# Patient Record
Sex: FEMALE | Race: WHITE | Employment: OTHER | URBAN - METROPOLITAN AREA
[De-identification: names, ages, dates, MRNs, and addresses within clinical notes are randomized per-mention and may not be internally consistent; named-entity substitution may affect disease eponyms.]

---

## 2018-01-02 PROBLEM — I10 HTN (HYPERTENSION), BENIGN: Chronic | Status: ACTIVE | Noted: 2018-01-02

## 2018-01-02 PROBLEM — F90.9 ADHD: Chronic | Status: ACTIVE | Noted: 2018-01-02

## 2018-01-02 PROBLEM — E11.9 DIABETES MELLITUS TYPE 2, CONTROLLED (HCC): Chronic | Status: ACTIVE | Noted: 2018-01-02

## 2018-01-02 PROBLEM — N12 PYELONEPHRITIS: Status: ACTIVE | Noted: 2018-01-02

## 2018-01-02 PROBLEM — E78.2 MIXED HYPERLIPIDEMIA: Chronic | Status: ACTIVE | Noted: 2018-01-02

## 2018-01-04 PROBLEM — N12 PYELONEPHRITIS: Status: RESOLVED | Noted: 2018-01-02 | Resolved: 2018-01-04

## 2018-03-23 ENCOUNTER — TELEPHONE (OUTPATIENT)
Dept: ORTHOPEDIC SURGERY | Age: 78
End: 2018-03-23

## 2018-04-09 ENCOUNTER — HOSPITAL ENCOUNTER (OUTPATIENT)
Dept: CARDIOLOGY | Age: 78
Discharge: HOME | End: 2018-04-09
Payer: MEDICARE

## 2018-04-09 VITALS
HEIGHT: 62 IN | HEART RATE: 80 BPM | BODY MASS INDEX: 36.8 KG/M2 | SYSTOLIC BLOOD PRESSURE: 142 MMHG | WEIGHT: 200 LBS | DIASTOLIC BLOOD PRESSURE: 70 MMHG

## 2018-04-09 DIAGNOSIS — R07.9 CHEST PAIN, UNSPECIFIED TYPE: Primary | ICD-10-CM

## 2018-04-09 PROCEDURE — 78452 HT MUSCLE IMAGE SPECT MULT: CPT

## 2018-04-09 PROCEDURE — 6360000002 HC RX W HCPCS: Performed by: FAMILY MEDICINE

## 2018-04-09 PROCEDURE — 93017 CV STRESS TEST TRACING ONLY: CPT

## 2018-04-09 PROCEDURE — 3430000000 HC RX DIAGNOSTIC RADIOPHARMACEUTICAL: Performed by: INTERNAL MEDICINE

## 2018-04-09 PROCEDURE — A9500 TC99M SESTAMIBI: HCPCS | Performed by: INTERNAL MEDICINE

## 2018-04-09 PROCEDURE — 2580000003 HC RX 258: Performed by: INTERNAL MEDICINE

## 2018-04-09 RX ORDER — SODIUM CHLORIDE 0.9 % (FLUSH) 0.9 %
10 SYRINGE (ML) INJECTION PRN
Status: DISCONTINUED | OUTPATIENT
Start: 2018-04-09 | End: 2018-04-10 | Stop reason: HOSPADM

## 2018-04-09 RX ADMIN — Medication 10.1 MILLICURIE: at 09:34

## 2018-04-09 RX ADMIN — Medication 32.1 MILLICURIE: at 10:43

## 2018-04-09 RX ADMIN — Medication 10 ML: at 10:44

## 2018-04-09 RX ADMIN — Medication 10 ML: at 09:34

## 2018-04-09 RX ADMIN — REGADENOSON 0.4 MG: 0.08 INJECTION, SOLUTION INTRAVENOUS at 10:43

## 2018-04-09 RX ADMIN — Medication 10 ML: at 10:43

## 2018-04-24 ENCOUNTER — TELEPHONE (OUTPATIENT)
Dept: ORTHOPEDIC SURGERY | Age: 78
End: 2018-04-24

## 2018-04-30 ENCOUNTER — TELEPHONE (OUTPATIENT)
Dept: ORTHOPEDIC SURGERY | Age: 78
End: 2018-04-30

## 2018-05-02 ENCOUNTER — HOSPITAL ENCOUNTER (OUTPATIENT)
Dept: PREADMISSION TESTING | Age: 78
Discharge: HOME | End: 2018-05-02
Payer: MEDICARE

## 2018-05-02 ENCOUNTER — HOSPITAL ENCOUNTER (OUTPATIENT)
Dept: GENERAL RADIOLOGY | Age: 78
Discharge: HOME | End: 2018-05-04
Payer: MEDICARE

## 2018-05-02 VITALS
SYSTOLIC BLOOD PRESSURE: 142 MMHG | BODY MASS INDEX: 36.99 KG/M2 | WEIGHT: 201 LBS | DIASTOLIC BLOOD PRESSURE: 66 MMHG | OXYGEN SATURATION: 99 % | HEIGHT: 62 IN | HEART RATE: 61 BPM | TEMPERATURE: 97.4 F | RESPIRATION RATE: 20 BRPM

## 2018-05-02 DIAGNOSIS — M17.11 PRIMARY OSTEOARTHRITIS OF RIGHT KNEE: ICD-10-CM

## 2018-05-02 DIAGNOSIS — Z01.818 PREOP TESTING: ICD-10-CM

## 2018-05-02 LAB
ABO/RH: NORMAL
ALBUMIN SERPL-MCNC: 4.2 G/DL (ref 3.5–5.2)
ALP BLD-CCNC: 52 U/L (ref 35–104)
ALT SERPL-CCNC: 11 U/L (ref 0–32)
ANION GAP SERPL CALCULATED.3IONS-SCNC: 13 MMOL/L (ref 7–16)
ANTIBODY SCREEN: NORMAL
AST SERPL-CCNC: 12 U/L (ref 0–31)
BACTERIA: ABNORMAL /HPF
BASOPHILS ABSOLUTE: 0.05 E9/L (ref 0–0.2)
BASOPHILS RELATIVE PERCENT: 0.7 % (ref 0–2)
BILIRUB SERPL-MCNC: 0.3 MG/DL (ref 0–1.2)
BILIRUBIN URINE: NEGATIVE
BLOOD, URINE: NEGATIVE
BUN BLDV-MCNC: 23 MG/DL (ref 8–23)
CALCIUM SERPL-MCNC: 9.7 MG/DL (ref 8.6–10.2)
CHLORIDE BLD-SCNC: 102 MMOL/L (ref 98–107)
CLARITY: CLEAR
CO2: 22 MMOL/L (ref 22–29)
COLOR: YELLOW
CREAT SERPL-MCNC: 1.2 MG/DL (ref 0.5–1)
EOSINOPHILS ABSOLUTE: 0.21 E9/L (ref 0.05–0.5)
EOSINOPHILS RELATIVE PERCENT: 2.9 % (ref 0–6)
GFR AFRICAN AMERICAN: 53
GFR NON-AFRICAN AMERICAN: 44 ML/MIN/1.73
GLUCOSE BLD-MCNC: 114 MG/DL (ref 74–109)
GLUCOSE URINE: NEGATIVE MG/DL
HCT VFR BLD CALC: 31.8 % (ref 34–48)
HEMOGLOBIN: 10.6 G/DL (ref 11.5–15.5)
IMMATURE GRANULOCYTES #: 0.05 E9/L
IMMATURE GRANULOCYTES %: 0.7 % (ref 0–5)
KETONES, URINE: NEGATIVE MG/DL
LEUKOCYTE ESTERASE, URINE: ABNORMAL
LYMPHOCYTES ABSOLUTE: 2.16 E9/L (ref 1.5–4)
LYMPHOCYTES RELATIVE PERCENT: 30.3 % (ref 20–42)
MCH RBC QN AUTO: 30.5 PG (ref 26–35)
MCHC RBC AUTO-ENTMCNC: 33.3 % (ref 32–34.5)
MCV RBC AUTO: 91.4 FL (ref 80–99.9)
MONOCYTES ABSOLUTE: 0.52 E9/L (ref 0.1–0.95)
MONOCYTES RELATIVE PERCENT: 7.3 % (ref 2–12)
NEUTROPHILS ABSOLUTE: 4.15 E9/L (ref 1.8–7.3)
NEUTROPHILS RELATIVE PERCENT: 58.1 % (ref 43–80)
NITRITE, URINE: NEGATIVE
PDW BLD-RTO: 13.4 FL (ref 11.5–15)
PH UA: 5.5 (ref 5–9)
PLATELET # BLD: 283 E9/L (ref 130–450)
PMV BLD AUTO: 9.4 FL (ref 7–12)
POTASSIUM REFLEX MAGNESIUM: 4.6 MMOL/L (ref 3.5–5)
PROTEIN UA: NEGATIVE MG/DL
RBC # BLD: 3.48 E12/L (ref 3.5–5.5)
RBC UA: ABNORMAL /HPF (ref 0–2)
SODIUM BLD-SCNC: 137 MMOL/L (ref 132–146)
SPECIFIC GRAVITY UA: >=1.03 (ref 1–1.03)
TOTAL PROTEIN: 7.2 G/DL (ref 6.4–8.3)
UROBILINOGEN, URINE: 0.2 E.U./DL
WBC # BLD: 7.1 E9/L (ref 4.5–11.5)
WBC UA: ABNORMAL /HPF (ref 0–5)

## 2018-05-02 PROCEDURE — 86901 BLOOD TYPING SEROLOGIC RH(D): CPT

## 2018-05-02 PROCEDURE — 85025 COMPLETE CBC W/AUTO DIFF WBC: CPT

## 2018-05-02 PROCEDURE — 86900 BLOOD TYPING SEROLOGIC ABO: CPT

## 2018-05-02 PROCEDURE — 71046 X-RAY EXAM CHEST 2 VIEWS: CPT

## 2018-05-02 PROCEDURE — 86850 RBC ANTIBODY SCREEN: CPT

## 2018-05-02 PROCEDURE — 81001 URINALYSIS AUTO W/SCOPE: CPT

## 2018-05-02 PROCEDURE — 80053 COMPREHEN METABOLIC PANEL: CPT

## 2018-05-02 PROCEDURE — 87081 CULTURE SCREEN ONLY: CPT

## 2018-05-02 PROCEDURE — 36415 COLL VENOUS BLD VENIPUNCTURE: CPT

## 2018-05-03 LAB — MRSA CULTURE ONLY: NORMAL

## 2018-05-07 ENCOUNTER — ANESTHESIA EVENT (OUTPATIENT)
Dept: OPERATING ROOM | Age: 78
DRG: 470 | End: 2018-05-07
Payer: MEDICARE

## 2018-05-08 ENCOUNTER — HOSPITAL ENCOUNTER (INPATIENT)
Age: 78
LOS: 2 days | Discharge: NURSING, REHAB OR LONG-TERM CARE | DRG: 470 | End: 2018-05-10
Attending: ORTHOPAEDIC SURGERY | Admitting: ORTHOPAEDIC SURGERY
Payer: MEDICARE

## 2018-05-08 ENCOUNTER — ANESTHESIA (OUTPATIENT)
Dept: OPERATING ROOM | Age: 78
DRG: 470 | End: 2018-05-08
Payer: MEDICARE

## 2018-05-08 VITALS — SYSTOLIC BLOOD PRESSURE: 159 MMHG | OXYGEN SATURATION: 99 % | TEMPERATURE: 96.8 F | DIASTOLIC BLOOD PRESSURE: 75 MMHG

## 2018-05-08 DIAGNOSIS — M17.11 PRIMARY OSTEOARTHRITIS OF RIGHT KNEE: ICD-10-CM

## 2018-05-08 DIAGNOSIS — Z01.818 PREOP TESTING: Primary | ICD-10-CM

## 2018-05-08 LAB
METER GLUCOSE: 143 MG/DL (ref 70–110)
METER GLUCOSE: 230 MG/DL (ref 70–110)
METER GLUCOSE: 268 MG/DL (ref 70–110)

## 2018-05-08 PROCEDURE — 2500000003 HC RX 250 WO HCPCS: Performed by: ORTHOPAEDIC SURGERY

## 2018-05-08 PROCEDURE — 6360000002 HC RX W HCPCS: Performed by: NURSE ANESTHETIST, CERTIFIED REGISTERED

## 2018-05-08 PROCEDURE — 6360000002 HC RX W HCPCS: Performed by: ANESTHESIOLOGY

## 2018-05-08 PROCEDURE — 2580000003 HC RX 258: Performed by: ORTHOPAEDIC SURGERY

## 2018-05-08 PROCEDURE — 3700000000 HC ANESTHESIA ATTENDED CARE: Performed by: ORTHOPAEDIC SURGERY

## 2018-05-08 PROCEDURE — 6360000002 HC RX W HCPCS: Performed by: ORTHOPAEDIC SURGERY

## 2018-05-08 PROCEDURE — C1776 JOINT DEVICE (IMPLANTABLE): HCPCS | Performed by: ORTHOPAEDIC SURGERY

## 2018-05-08 PROCEDURE — 2720000010 HC SURG SUPPLY STERILE: Performed by: ORTHOPAEDIC SURGERY

## 2018-05-08 PROCEDURE — 7100000000 HC PACU RECOVERY - FIRST 15 MIN: Performed by: ORTHOPAEDIC SURGERY

## 2018-05-08 PROCEDURE — 3700000001 HC ADD 15 MINUTES (ANESTHESIA): Performed by: ORTHOPAEDIC SURGERY

## 2018-05-08 PROCEDURE — 6370000000 HC RX 637 (ALT 250 FOR IP): Performed by: INTERNAL MEDICINE

## 2018-05-08 PROCEDURE — 1200000000 HC SEMI PRIVATE

## 2018-05-08 PROCEDURE — 88305 TISSUE EXAM BY PATHOLOGIST: CPT

## 2018-05-08 PROCEDURE — G8979 MOBILITY GOAL STATUS: HCPCS

## 2018-05-08 PROCEDURE — 97530 THERAPEUTIC ACTIVITIES: CPT

## 2018-05-08 PROCEDURE — 27447 TOTAL KNEE ARTHROPLASTY: CPT | Performed by: ORTHOPAEDIC SURGERY

## 2018-05-08 PROCEDURE — 64447 NJX AA&/STRD FEMORAL NRV IMG: CPT | Performed by: ANESTHESIOLOGY

## 2018-05-08 PROCEDURE — G8987 SELF CARE CURRENT STATUS: HCPCS

## 2018-05-08 PROCEDURE — 6360000002 HC RX W HCPCS: Performed by: STUDENT IN AN ORGANIZED HEALTH CARE EDUCATION/TRAINING PROGRAM

## 2018-05-08 PROCEDURE — G8978 MOBILITY CURRENT STATUS: HCPCS

## 2018-05-08 PROCEDURE — 0232T NJX PLATELET PLASMA: CPT | Performed by: ORTHOPAEDIC SURGERY

## 2018-05-08 PROCEDURE — 2500000003 HC RX 250 WO HCPCS: Performed by: NURSE ANESTHETIST, CERTIFIED REGISTERED

## 2018-05-08 PROCEDURE — 6370000000 HC RX 637 (ALT 250 FOR IP): Performed by: STUDENT IN AN ORGANIZED HEALTH CARE EDUCATION/TRAINING PROGRAM

## 2018-05-08 PROCEDURE — C1713 ANCHOR/SCREW BN/BN,TIS/BN: HCPCS | Performed by: ORTHOPAEDIC SURGERY

## 2018-05-08 PROCEDURE — 2580000003 HC RX 258: Performed by: STUDENT IN AN ORGANIZED HEALTH CARE EDUCATION/TRAINING PROGRAM

## 2018-05-08 PROCEDURE — 97165 OT EVAL LOW COMPLEX 30 MIN: CPT

## 2018-05-08 PROCEDURE — 7100000001 HC PACU RECOVERY - ADDTL 15 MIN: Performed by: ORTHOPAEDIC SURGERY

## 2018-05-08 PROCEDURE — 3600000015 HC SURGERY LEVEL 5 ADDTL 15MIN: Performed by: ORTHOPAEDIC SURGERY

## 2018-05-08 PROCEDURE — 3600000005 HC SURGERY LEVEL 5 BASE: Performed by: ORTHOPAEDIC SURGERY

## 2018-05-08 PROCEDURE — 88311 DECALCIFY TISSUE: CPT

## 2018-05-08 PROCEDURE — 82962 GLUCOSE BLOOD TEST: CPT

## 2018-05-08 PROCEDURE — G8988 SELF CARE GOAL STATUS: HCPCS

## 2018-05-08 PROCEDURE — 2580000003 HC RX 258: Performed by: NURSE ANESTHETIST, CERTIFIED REGISTERED

## 2018-05-08 PROCEDURE — 0SRC0J9 REPLACEMENT OF RIGHT KNEE JOINT WITH SYNTHETIC SUBSTITUTE, CEMENTED, OPEN APPROACH: ICD-10-PCS | Performed by: ORTHOPAEDIC SURGERY

## 2018-05-08 PROCEDURE — 3E0T3BZ INTRODUCTION OF ANESTHETIC AGENT INTO PERIPHERAL NERVES AND PLEXI, PERCUTANEOUS APPROACH: ICD-10-PCS | Performed by: ANESTHESIOLOGY

## 2018-05-08 DEVICE — BASEPLATE TIB SZ 3 KNEE TRITANIUM CEM PRI LO PROF TRIATHLON: Type: IMPLANTABLE DEVICE | Site: KNEE | Status: FUNCTIONAL

## 2018-05-08 DEVICE — IMPLANTABLE DEVICE: Type: IMPLANTABLE DEVICE | Site: KNEE | Status: FUNCTIONAL

## 2018-05-08 DEVICE — CEMENT BNE 20ML 40GM FULL DOSE PMMA W/O ANTIBIO M VISC: Type: IMPLANTABLE DEVICE | Site: KNEE | Status: FUNCTIONAL

## 2018-05-08 RX ORDER — SODIUM CHLORIDE 0.9 % (FLUSH) 0.9 %
10 SYRINGE (ML) INJECTION PRN
Status: DISCONTINUED | OUTPATIENT
Start: 2018-05-08 | End: 2018-05-10 | Stop reason: HOSPADM

## 2018-05-08 RX ORDER — ASPIRIN 81 MG/1
81 TABLET ORAL 2 TIMES DAILY
Status: DISCONTINUED | OUTPATIENT
Start: 2018-05-08 | End: 2018-05-08

## 2018-05-08 RX ORDER — FENTANYL CITRATE 50 UG/ML
100 INJECTION, SOLUTION INTRAMUSCULAR; INTRAVENOUS ONCE
Status: COMPLETED | OUTPATIENT
Start: 2018-05-08 | End: 2018-05-08

## 2018-05-08 RX ORDER — DEXAMETHASONE SODIUM PHOSPHATE 10 MG/ML
INJECTION, SOLUTION INTRAMUSCULAR; INTRAVENOUS PRN
Status: DISCONTINUED | OUTPATIENT
Start: 2018-05-08 | End: 2018-05-08 | Stop reason: SDUPTHER

## 2018-05-08 RX ORDER — LIDOCAINE HYDROCHLORIDE 20 MG/ML
INJECTION, SOLUTION INFILTRATION; PERINEURAL PRN
Status: DISCONTINUED | OUTPATIENT
Start: 2018-05-08 | End: 2018-05-08 | Stop reason: SDUPTHER

## 2018-05-08 RX ORDER — CHOLESTYRAMINE LIGHT 4 G/5.7G
4 POWDER, FOR SUSPENSION ORAL DAILY
Status: DISCONTINUED | OUTPATIENT
Start: 2018-05-08 | End: 2018-05-10 | Stop reason: HOSPADM

## 2018-05-08 RX ORDER — LANOLIN ALCOHOL/MO/W.PET/CERES
200 CREAM (GRAM) TOPICAL DAILY
Status: DISCONTINUED | OUTPATIENT
Start: 2018-05-08 | End: 2018-05-10 | Stop reason: HOSPADM

## 2018-05-08 RX ORDER — SENNA AND DOCUSATE SODIUM 50; 8.6 MG/1; MG/1
1 TABLET, FILM COATED ORAL DAILY
Status: DISCONTINUED | OUTPATIENT
Start: 2018-05-08 | End: 2018-05-10 | Stop reason: HOSPADM

## 2018-05-08 RX ORDER — DOCUSATE SODIUM 100 MG/1
100 CAPSULE, LIQUID FILLED ORAL 2 TIMES DAILY
Status: DISCONTINUED | OUTPATIENT
Start: 2018-05-08 | End: 2018-05-08

## 2018-05-08 RX ORDER — GLYCOPYRROLATE 0.2 MG/ML
INJECTION INTRAMUSCULAR; INTRAVENOUS PRN
Status: DISCONTINUED | OUTPATIENT
Start: 2018-05-08 | End: 2018-05-08 | Stop reason: SDUPTHER

## 2018-05-08 RX ORDER — LANOLIN ALCOHOL/MO/W.PET/CERES
200 CREAM (GRAM) TOPICAL DAILY
Status: CANCELLED | OUTPATIENT
Start: 2018-05-08

## 2018-05-08 RX ORDER — SODIUM CHLORIDE 9 MG/ML
INJECTION, SOLUTION INTRAVENOUS CONTINUOUS PRN
Status: DISCONTINUED | OUTPATIENT
Start: 2018-05-08 | End: 2018-05-08 | Stop reason: SDUPTHER

## 2018-05-08 RX ORDER — CLOPIDOGREL BISULFATE 75 MG/1
75 TABLET ORAL DAILY
Status: CANCELLED | OUTPATIENT
Start: 2018-05-08

## 2018-05-08 RX ORDER — DOCUSATE SODIUM 100 MG/1
100 CAPSULE, LIQUID FILLED ORAL 2 TIMES DAILY
Status: CANCELLED | OUTPATIENT
Start: 2018-05-08

## 2018-05-08 RX ORDER — LABETALOL HYDROCHLORIDE 5 MG/ML
10 INJECTION, SOLUTION INTRAVENOUS EVERY 10 MIN PRN
Status: DISCONTINUED | OUTPATIENT
Start: 2018-05-08 | End: 2018-05-08 | Stop reason: HOSPADM

## 2018-05-08 RX ORDER — DIPHENHYDRAMINE HYDROCHLORIDE 50 MG/ML
25 INJECTION INTRAMUSCULAR; INTRAVENOUS EVERY 6 HOURS PRN
Status: DISCONTINUED | OUTPATIENT
Start: 2018-05-08 | End: 2018-05-10 | Stop reason: HOSPADM

## 2018-05-08 RX ORDER — ENALAPRIL MALEATE 5 MG/1
5 TABLET ORAL DAILY
Status: CANCELLED | OUTPATIENT
Start: 2018-05-08

## 2018-05-08 RX ORDER — CLOPIDOGREL BISULFATE 75 MG/1
75 TABLET ORAL DAILY
Status: DISCONTINUED | OUTPATIENT
Start: 2018-05-09 | End: 2018-05-10 | Stop reason: HOSPADM

## 2018-05-08 RX ORDER — UBIDECARENONE 30 MG
500 CAPSULE ORAL DAILY
Status: DISCONTINUED | OUTPATIENT
Start: 2018-05-08 | End: 2018-05-08 | Stop reason: CLARIF

## 2018-05-08 RX ORDER — HYDROCODONE BITARTRATE AND ACETAMINOPHEN 5; 325 MG/1; MG/1
2 TABLET ORAL EVERY 4 HOURS PRN
Status: DISCONTINUED | OUTPATIENT
Start: 2018-05-08 | End: 2018-05-10 | Stop reason: HOSPADM

## 2018-05-08 RX ORDER — ASPIRIN 81 MG/1
81 TABLET ORAL DAILY
Status: DISCONTINUED | OUTPATIENT
Start: 2018-05-08 | End: 2018-05-10 | Stop reason: HOSPADM

## 2018-05-08 RX ORDER — METFORMIN HYDROCHLORIDE 750 MG/1
750 TABLET, EXTENDED RELEASE ORAL DAILY
Status: DISCONTINUED | OUTPATIENT
Start: 2018-05-08 | End: 2018-05-10 | Stop reason: HOSPADM

## 2018-05-08 RX ORDER — SODIUM CHLORIDE 0.9 % (FLUSH) 0.9 %
10 SYRINGE (ML) INJECTION EVERY 12 HOURS SCHEDULED
Status: DISCONTINUED | OUTPATIENT
Start: 2018-05-08 | End: 2018-05-10 | Stop reason: HOSPADM

## 2018-05-08 RX ORDER — SODIUM CHLORIDE 9 MG/ML
INJECTION, SOLUTION INTRAVENOUS CONTINUOUS
Status: ACTIVE | OUTPATIENT
Start: 2018-05-08 | End: 2018-05-09

## 2018-05-08 RX ORDER — MORPHINE SULFATE 2 MG/ML
1 INJECTION, SOLUTION INTRAMUSCULAR; INTRAVENOUS EVERY 5 MIN PRN
Status: DISCONTINUED | OUTPATIENT
Start: 2018-05-08 | End: 2018-05-08 | Stop reason: HOSPADM

## 2018-05-08 RX ORDER — FENTANYL CITRATE 50 UG/ML
INJECTION, SOLUTION INTRAMUSCULAR; INTRAVENOUS PRN
Status: DISCONTINUED | OUTPATIENT
Start: 2018-05-08 | End: 2018-05-08 | Stop reason: SDUPTHER

## 2018-05-08 RX ORDER — DOCUSATE SODIUM 100 MG/1
100 CAPSULE, LIQUID FILLED ORAL 2 TIMES DAILY
Status: DISCONTINUED | OUTPATIENT
Start: 2018-05-08 | End: 2018-05-10 | Stop reason: HOSPADM

## 2018-05-08 RX ORDER — PROMETHAZINE HYDROCHLORIDE 25 MG/ML
25 INJECTION, SOLUTION INTRAMUSCULAR; INTRAVENOUS EVERY 6 HOURS PRN
Status: DISCONTINUED | OUTPATIENT
Start: 2018-05-08 | End: 2018-05-10 | Stop reason: HOSPADM

## 2018-05-08 RX ORDER — MORPHINE SULFATE 2 MG/ML
2 INJECTION, SOLUTION INTRAMUSCULAR; INTRAVENOUS
Status: DISCONTINUED | OUTPATIENT
Start: 2018-05-08 | End: 2018-05-10 | Stop reason: HOSPADM

## 2018-05-08 RX ORDER — HYDRALAZINE HYDROCHLORIDE 20 MG/ML
10 INJECTION INTRAMUSCULAR; INTRAVENOUS EVERY 6 HOURS PRN
Status: DISCONTINUED | OUTPATIENT
Start: 2018-05-08 | End: 2018-05-10 | Stop reason: HOSPADM

## 2018-05-08 RX ORDER — SODIUM CHLORIDE 0.9 % (FLUSH) 0.9 %
10 SYRINGE (ML) INJECTION PRN
Status: DISCONTINUED | OUTPATIENT
Start: 2018-05-08 | End: 2018-05-08 | Stop reason: HOSPADM

## 2018-05-08 RX ORDER — DEXTROSE MONOHYDRATE 25 G/50ML
12.5 INJECTION, SOLUTION INTRAVENOUS PRN
Status: DISCONTINUED | OUTPATIENT
Start: 2018-05-08 | End: 2018-05-10 | Stop reason: HOSPADM

## 2018-05-08 RX ORDER — CALCIUM GLUCONATE 94 MG/ML
INJECTION, SOLUTION INTRAVENOUS CONTINUOUS PRN
Status: COMPLETED | OUTPATIENT
Start: 2018-05-08 | End: 2018-05-08

## 2018-05-08 RX ORDER — PROMETHAZINE HYDROCHLORIDE 25 MG/ML
12.5 INJECTION, SOLUTION INTRAMUSCULAR; INTRAVENOUS
Status: COMPLETED | OUTPATIENT
Start: 2018-05-08 | End: 2018-05-08

## 2018-05-08 RX ORDER — SODIUM CHLORIDE 9 MG/ML
INJECTION, SOLUTION INTRAVENOUS CONTINUOUS
Status: DISCONTINUED | OUTPATIENT
Start: 2018-05-08 | End: 2018-05-08

## 2018-05-08 RX ORDER — MIDAZOLAM HYDROCHLORIDE 1 MG/ML
2 INJECTION INTRAMUSCULAR; INTRAVENOUS ONCE
Status: COMPLETED | OUTPATIENT
Start: 2018-05-08 | End: 2018-05-08

## 2018-05-08 RX ORDER — DONEPEZIL HYDROCHLORIDE 5 MG/1
5 TABLET, FILM COATED ORAL NIGHTLY
Status: DISCONTINUED | OUTPATIENT
Start: 2018-05-08 | End: 2018-05-10 | Stop reason: HOSPADM

## 2018-05-08 RX ORDER — ACETAMINOPHEN 325 MG/1
650 TABLET ORAL EVERY 4 HOURS PRN
Status: DISCONTINUED | OUTPATIENT
Start: 2018-05-08 | End: 2018-05-10 | Stop reason: HOSPADM

## 2018-05-08 RX ORDER — ROCURONIUM BROMIDE 10 MG/ML
INJECTION, SOLUTION INTRAVENOUS PRN
Status: DISCONTINUED | OUTPATIENT
Start: 2018-05-08 | End: 2018-05-08 | Stop reason: SDUPTHER

## 2018-05-08 RX ORDER — ATENOLOL 25 MG/1
25 TABLET ORAL 2 TIMES DAILY
Status: DISCONTINUED | OUTPATIENT
Start: 2018-05-08 | End: 2018-05-10 | Stop reason: HOSPADM

## 2018-05-08 RX ORDER — ROPIVACAINE HYDROCHLORIDE 5 MG/ML
30 INJECTION, SOLUTION EPIDURAL; INFILTRATION; PERINEURAL ONCE
Status: COMPLETED | OUTPATIENT
Start: 2018-05-08 | End: 2018-05-08

## 2018-05-08 RX ORDER — TROSPIUM CHLORIDE 20 MG/1
20 TABLET, FILM COATED ORAL
Status: CANCELLED | OUTPATIENT
Start: 2018-05-08

## 2018-05-08 RX ORDER — ENALAPRIL MALEATE 5 MG/1
5 TABLET ORAL DAILY
Status: DISCONTINUED | OUTPATIENT
Start: 2018-05-09 | End: 2018-05-10 | Stop reason: HOSPADM

## 2018-05-08 RX ORDER — ATENOLOL 25 MG/1
25 TABLET ORAL 2 TIMES DAILY
Status: CANCELLED | OUTPATIENT
Start: 2018-05-08

## 2018-05-08 RX ORDER — ONDANSETRON 2 MG/ML
INJECTION INTRAMUSCULAR; INTRAVENOUS PRN
Status: DISCONTINUED | OUTPATIENT
Start: 2018-05-08 | End: 2018-05-08 | Stop reason: SDUPTHER

## 2018-05-08 RX ORDER — PROPOFOL 10 MG/ML
INJECTION, EMULSION INTRAVENOUS PRN
Status: DISCONTINUED | OUTPATIENT
Start: 2018-05-08 | End: 2018-05-08 | Stop reason: SDUPTHER

## 2018-05-08 RX ORDER — NICOTINE POLACRILEX 4 MG
15 LOZENGE BUCCAL PRN
Status: DISCONTINUED | OUTPATIENT
Start: 2018-05-08 | End: 2018-05-10 | Stop reason: HOSPADM

## 2018-05-08 RX ORDER — SODIUM CHLORIDE 0.9 % (FLUSH) 0.9 %
10 SYRINGE (ML) INJECTION EVERY 12 HOURS SCHEDULED
Status: DISCONTINUED | OUTPATIENT
Start: 2018-05-08 | End: 2018-05-08 | Stop reason: HOSPADM

## 2018-05-08 RX ORDER — ONDANSETRON 2 MG/ML
4 INJECTION INTRAMUSCULAR; INTRAVENOUS EVERY 6 HOURS PRN
Status: DISCONTINUED | OUTPATIENT
Start: 2018-05-08 | End: 2018-05-10 | Stop reason: HOSPADM

## 2018-05-08 RX ORDER — TROSPIUM CHLORIDE 20 MG/1
20 TABLET, FILM COATED ORAL NIGHTLY
Status: DISCONTINUED | OUTPATIENT
Start: 2018-05-08 | End: 2018-05-10 | Stop reason: HOSPADM

## 2018-05-08 RX ORDER — NEOSTIGMINE METHYLSULFATE 1 MG/ML
INJECTION, SOLUTION INTRAVENOUS PRN
Status: DISCONTINUED | OUTPATIENT
Start: 2018-05-08 | End: 2018-05-08 | Stop reason: SDUPTHER

## 2018-05-08 RX ORDER — METFORMIN HYDROCHLORIDE 750 MG/1
750 TABLET, EXTENDED RELEASE ORAL DAILY
Status: CANCELLED | OUTPATIENT
Start: 2018-05-08

## 2018-05-08 RX ORDER — MORPHINE SULFATE 4 MG/ML
4 INJECTION, SOLUTION INTRAMUSCULAR; INTRAVENOUS
Status: DISCONTINUED | OUTPATIENT
Start: 2018-05-08 | End: 2018-05-10 | Stop reason: HOSPADM

## 2018-05-08 RX ORDER — HYDROCODONE BITARTRATE AND ACETAMINOPHEN 5; 325 MG/1; MG/1
1 TABLET ORAL EVERY 4 HOURS PRN
Status: DISCONTINUED | OUTPATIENT
Start: 2018-05-08 | End: 2018-05-10 | Stop reason: HOSPADM

## 2018-05-08 RX ORDER — DONEPEZIL HYDROCHLORIDE 5 MG/1
5 TABLET, FILM COATED ORAL NIGHTLY
Status: CANCELLED | OUTPATIENT
Start: 2018-05-08

## 2018-05-08 RX ORDER — CHOLESTYRAMINE LIGHT 4 G/5.7G
4 POWDER, FOR SUSPENSION ORAL DAILY
Status: CANCELLED | OUTPATIENT
Start: 2018-05-08

## 2018-05-08 RX ORDER — DEXTROSE MONOHYDRATE 50 MG/ML
100 INJECTION, SOLUTION INTRAVENOUS PRN
Status: DISCONTINUED | OUTPATIENT
Start: 2018-05-08 | End: 2018-05-10 | Stop reason: HOSPADM

## 2018-05-08 RX ADMIN — MIDAZOLAM 2 MG: 1 INJECTION INTRAMUSCULAR; INTRAVENOUS at 08:03

## 2018-05-08 RX ADMIN — HYDROCODONE BITARTRATE AND ACETAMINOPHEN 2 TABLET: 5; 325 TABLET ORAL at 12:02

## 2018-05-08 RX ADMIN — ROPIVACAINE HYDROCHLORIDE 30 ML: 5 INJECTION, SOLUTION EPIDURAL; INFILTRATION; PERINEURAL at 08:03

## 2018-05-08 RX ADMIN — METFORMIN HYDROCHLORIDE 750 MG: 750 TABLET, EXTENDED RELEASE ORAL at 14:33

## 2018-05-08 RX ADMIN — PROPOFOL 100 MG: 10 INJECTION, EMULSION INTRAVENOUS at 08:10

## 2018-05-08 RX ADMIN — ROCURONIUM BROMIDE 35 MG: 10 INJECTION, SOLUTION INTRAVENOUS at 08:10

## 2018-05-08 RX ADMIN — INSULIN LISPRO 3 UNITS: 100 INJECTION, SOLUTION INTRAVENOUS; SUBCUTANEOUS at 18:52

## 2018-05-08 RX ADMIN — DOCUSATE SODIUM 100 MG: 100 CAPSULE, LIQUID FILLED ORAL at 14:32

## 2018-05-08 RX ADMIN — CHOLESTYRAMINE 4 G: 4 POWDER, FOR SUSPENSION ORAL at 14:33

## 2018-05-08 RX ADMIN — TRANEXAMIC ACID 1 G: 100 INJECTION, SOLUTION INTRAVENOUS at 08:15

## 2018-05-08 RX ADMIN — PYRIDOXINE HCL TAB 50 MG 200 MG: 50 TAB at 14:32

## 2018-05-08 RX ADMIN — Medication 10 ML: at 21:05

## 2018-05-08 RX ADMIN — Medication 3 MG: at 09:43

## 2018-05-08 RX ADMIN — ROCURONIUM BROMIDE 15 MG: 10 INJECTION, SOLUTION INTRAVENOUS at 08:34

## 2018-05-08 RX ADMIN — SODIUM CHLORIDE: 9 INJECTION, SOLUTION INTRAVENOUS at 14:34

## 2018-05-08 RX ADMIN — ROCURONIUM BROMIDE 10 MG: 10 INJECTION, SOLUTION INTRAVENOUS at 08:39

## 2018-05-08 RX ADMIN — HYDROCODONE BITARTRATE AND ACETAMINOPHEN 2 TABLET: 5; 325 TABLET ORAL at 18:53

## 2018-05-08 RX ADMIN — PHENYLEPHRINE HYDROCHLORIDE 100 MCG: 10 INJECTION INTRAMUSCULAR; INTRAVENOUS; SUBCUTANEOUS at 08:19

## 2018-05-08 RX ADMIN — PROMETHAZINE HYDROCHLORIDE 12.5 MG: 25 INJECTION INTRAMUSCULAR; INTRAVENOUS at 10:27

## 2018-05-08 RX ADMIN — FENTANYL CITRATE 50 MCG: 50 INJECTION, SOLUTION INTRAMUSCULAR; INTRAVENOUS at 09:27

## 2018-05-08 RX ADMIN — TROSPIUM CHLORIDE 20 MG: 20 TABLET ORAL at 21:05

## 2018-05-08 RX ADMIN — TRANEXAMIC ACID 1000 MG: 100 INJECTION, SOLUTION INTRAVENOUS at 11:17

## 2018-05-08 RX ADMIN — ASPIRIN 81 MG: 81 TABLET, COATED ORAL at 14:33

## 2018-05-08 RX ADMIN — FENTANYL CITRATE 50 MCG: 50 INJECTION, SOLUTION INTRAMUSCULAR; INTRAVENOUS at 08:10

## 2018-05-08 RX ADMIN — STANDARDIZED SENNA CONCENTRATE AND DOCUSATE SODIUM 1 TABLET: 8.6; 5 TABLET, FILM COATED ORAL at 14:32

## 2018-05-08 RX ADMIN — ONDANSETRON 4 MG: 2 INJECTION INTRAMUSCULAR; INTRAVENOUS at 09:33

## 2018-05-08 RX ADMIN — CEFAZOLIN SODIUM 2 G: 2 SOLUTION INTRAVENOUS at 08:04

## 2018-05-08 RX ADMIN — FENTANYL CITRATE 100 MCG: 50 INJECTION, SOLUTION INTRAMUSCULAR; INTRAVENOUS at 08:03

## 2018-05-08 RX ADMIN — GLYCOPYRROLATE 0.6 MG: 0.2 INJECTION, SOLUTION INTRAMUSCULAR; INTRAVENOUS at 09:43

## 2018-05-08 RX ADMIN — MORPHINE SULFATE 4 MG: 4 INJECTION, SOLUTION INTRAMUSCULAR; INTRAVENOUS at 21:05

## 2018-05-08 RX ADMIN — FENTANYL CITRATE 25 MCG: 50 INJECTION, SOLUTION INTRAMUSCULAR; INTRAVENOUS at 09:43

## 2018-05-08 RX ADMIN — ATENOLOL 25 MG: 25 TABLET ORAL at 21:05

## 2018-05-08 RX ADMIN — DONEPEZIL HYDROCHLORIDE 5 MG: 5 TABLET, FILM COATED ORAL at 21:05

## 2018-05-08 RX ADMIN — SODIUM CHLORIDE: 9 INJECTION, SOLUTION INTRAVENOUS at 09:46

## 2018-05-08 RX ADMIN — GLYCOPYRROLATE 0.2 MG: 0.2 INJECTION, SOLUTION INTRAMUSCULAR; INTRAVENOUS at 09:11

## 2018-05-08 RX ADMIN — LIDOCAINE HYDROCHLORIDE 100 MG: 20 INJECTION, SOLUTION INFILTRATION; PERINEURAL at 08:10

## 2018-05-08 RX ADMIN — VITAMIN D, TAB 1000IU (100/BT) 1000 UNITS: 25 TAB at 14:33

## 2018-05-08 RX ADMIN — DOCUSATE SODIUM 100 MG: 100 CAPSULE, LIQUID FILLED ORAL at 21:07

## 2018-05-08 RX ADMIN — DEXAMETHASONE SODIUM PHOSPHATE 10 MG: 10 INJECTION, SOLUTION INTRAMUSCULAR; INTRAVENOUS at 08:10

## 2018-05-08 RX ADMIN — LINAGLIPTIN 5 MG: 5 TABLET, FILM COATED ORAL at 14:33

## 2018-05-08 RX ADMIN — INSULIN LISPRO 1 UNITS: 100 INJECTION, SOLUTION INTRAVENOUS; SUBCUTANEOUS at 21:06

## 2018-05-08 RX ADMIN — CEFAZOLIN SODIUM 2 G: 2 SOLUTION INTRAVENOUS at 17:54

## 2018-05-08 RX ADMIN — SODIUM CHLORIDE: 9 INJECTION, SOLUTION INTRAVENOUS at 08:04

## 2018-05-08 ASSESSMENT — PULMONARY FUNCTION TESTS
PIF_VALUE: 27
PIF_VALUE: 27
PIF_VALUE: 26
PIF_VALUE: 26
PIF_VALUE: 27
PIF_VALUE: 0
PIF_VALUE: 19
PIF_VALUE: 26
PIF_VALUE: 27
PIF_VALUE: 27
PIF_VALUE: 26
PIF_VALUE: 26
PIF_VALUE: 20
PIF_VALUE: 27
PIF_VALUE: 27
PIF_VALUE: 24
PIF_VALUE: 27
PIF_VALUE: 27
PIF_VALUE: 26
PIF_VALUE: 26
PIF_VALUE: 25
PIF_VALUE: 27
PIF_VALUE: 3
PIF_VALUE: 7
PIF_VALUE: 3
PIF_VALUE: 27
PIF_VALUE: 28
PIF_VALUE: 24
PIF_VALUE: 26
PIF_VALUE: 27
PIF_VALUE: 24
PIF_VALUE: 26
PIF_VALUE: 14
PIF_VALUE: 27
PIF_VALUE: 26
PIF_VALUE: 26
PIF_VALUE: 24
PIF_VALUE: 26
PIF_VALUE: 27
PIF_VALUE: 28
PIF_VALUE: 11
PIF_VALUE: 28
PIF_VALUE: 26
PIF_VALUE: 10
PIF_VALUE: 0
PIF_VALUE: 19
PIF_VALUE: 27
PIF_VALUE: 26
PIF_VALUE: 24
PIF_VALUE: 24
PIF_VALUE: 27
PIF_VALUE: 20
PIF_VALUE: 3
PIF_VALUE: 26
PIF_VALUE: 22
PIF_VALUE: 25
PIF_VALUE: 26
PIF_VALUE: 25
PIF_VALUE: 7
PIF_VALUE: 27
PIF_VALUE: 26
PIF_VALUE: 27
PIF_VALUE: 27
PIF_VALUE: 0
PIF_VALUE: 24
PIF_VALUE: 26
PIF_VALUE: 31
PIF_VALUE: 1
PIF_VALUE: 8
PIF_VALUE: 22
PIF_VALUE: 9
PIF_VALUE: 26
PIF_VALUE: 27
PIF_VALUE: 27
PIF_VALUE: 10
PIF_VALUE: 27
PIF_VALUE: 27
PIF_VALUE: 26
PIF_VALUE: 13
PIF_VALUE: 26
PIF_VALUE: 27
PIF_VALUE: 0
PIF_VALUE: 25
PIF_VALUE: 27
PIF_VALUE: 25
PIF_VALUE: 9
PIF_VALUE: 26
PIF_VALUE: 27
PIF_VALUE: 26
PIF_VALUE: 27
PIF_VALUE: 27
PIF_VALUE: 10
PIF_VALUE: 26
PIF_VALUE: 15
PIF_VALUE: 26
PIF_VALUE: 26
PIF_VALUE: 0
PIF_VALUE: 24

## 2018-05-08 ASSESSMENT — PAIN SCALES - GENERAL
PAINLEVEL_OUTOF10: 9
PAINLEVEL_OUTOF10: 0
PAINLEVEL_OUTOF10: 0
PAINLEVEL_OUTOF10: 3
PAINLEVEL_OUTOF10: 0
PAINLEVEL_OUTOF10: 8
PAINLEVEL_OUTOF10: 4
PAINLEVEL_OUTOF10: 9
PAINLEVEL_OUTOF10: 9
PAINLEVEL_OUTOF10: 0

## 2018-05-08 ASSESSMENT — PAIN DESCRIPTION - LOCATION
LOCATION: KNEE

## 2018-05-08 ASSESSMENT — LIFESTYLE VARIABLES: SMOKING_STATUS: 0

## 2018-05-08 ASSESSMENT — PAIN DESCRIPTION - ORIENTATION
ORIENTATION: RIGHT

## 2018-05-08 ASSESSMENT — PAIN - FUNCTIONAL ASSESSMENT: PAIN_FUNCTIONAL_ASSESSMENT: 0-10

## 2018-05-08 ASSESSMENT — PAIN DESCRIPTION - PAIN TYPE
TYPE: SURGICAL PAIN

## 2018-05-08 ASSESSMENT — PAIN DESCRIPTION - DESCRIPTORS: DESCRIPTORS: ACHING;NUMBNESS;CONSTANT

## 2018-05-09 PROBLEM — M17.11 ARTHRITIS OF KNEE, RIGHT: Status: RESOLVED | Noted: 2018-05-08 | Resolved: 2018-05-09

## 2018-05-09 PROBLEM — E66.9 OBESITY (BMI 30-39.9): Chronic | Status: ACTIVE | Noted: 2018-05-09

## 2018-05-09 PROBLEM — F90.9 ADHD: Chronic | Status: RESOLVED | Noted: 2018-01-02 | Resolved: 2018-05-09

## 2018-05-09 LAB
ANION GAP SERPL CALCULATED.3IONS-SCNC: 15 MMOL/L (ref 7–16)
BASOPHILS ABSOLUTE: 0.02 E9/L (ref 0–0.2)
BASOPHILS RELATIVE PERCENT: 0.2 % (ref 0–2)
BUN BLDV-MCNC: 21 MG/DL (ref 8–23)
CALCIUM SERPL-MCNC: 8.7 MG/DL (ref 8.6–10.2)
CHLORIDE BLD-SCNC: 102 MMOL/L (ref 98–107)
CO2: 18 MMOL/L (ref 22–29)
CREAT SERPL-MCNC: 1.1 MG/DL (ref 0.5–1)
EOSINOPHILS ABSOLUTE: 0.11 E9/L (ref 0.05–0.5)
EOSINOPHILS RELATIVE PERCENT: 1.1 % (ref 0–6)
GFR AFRICAN AMERICAN: 58
GFR NON-AFRICAN AMERICAN: 48 ML/MIN/1.73
GLUCOSE BLD-MCNC: 194 MG/DL (ref 74–109)
HBA1C MFR BLD: 6.4 % (ref 4.8–5.9)
HCT VFR BLD CALC: 25.9 % (ref 34–48)
HEMOGLOBIN: 8.7 G/DL (ref 11.5–15.5)
IMMATURE GRANULOCYTES #: 0.06 E9/L
IMMATURE GRANULOCYTES %: 0.6 % (ref 0–5)
LYMPHOCYTES ABSOLUTE: 2 E9/L (ref 1.5–4)
LYMPHOCYTES RELATIVE PERCENT: 20.4 % (ref 20–42)
MCH RBC QN AUTO: 31 PG (ref 26–35)
MCHC RBC AUTO-ENTMCNC: 33.6 % (ref 32–34.5)
MCV RBC AUTO: 92.2 FL (ref 80–99.9)
METER GLUCOSE: 173 MG/DL (ref 70–110)
METER GLUCOSE: 181 MG/DL (ref 70–110)
METER GLUCOSE: 207 MG/DL (ref 70–110)
METER GLUCOSE: 222 MG/DL (ref 70–110)
MONOCYTES ABSOLUTE: 1.31 E9/L (ref 0.1–0.95)
MONOCYTES RELATIVE PERCENT: 13.3 % (ref 2–12)
NEUTROPHILS ABSOLUTE: 6.32 E9/L (ref 1.8–7.3)
NEUTROPHILS RELATIVE PERCENT: 64.4 % (ref 43–80)
PDW BLD-RTO: 13.5 FL (ref 11.5–15)
PLATELET # BLD: 277 E9/L (ref 130–450)
PMV BLD AUTO: 9.6 FL (ref 7–12)
POTASSIUM REFLEX MAGNESIUM: 4.5 MMOL/L (ref 3.5–5)
RBC # BLD: 2.81 E12/L (ref 3.5–5.5)
SODIUM BLD-SCNC: 135 MMOL/L (ref 132–146)
WBC # BLD: 9.8 E9/L (ref 4.5–11.5)

## 2018-05-09 PROCEDURE — 80048 BASIC METABOLIC PNL TOTAL CA: CPT

## 2018-05-09 PROCEDURE — 97530 THERAPEUTIC ACTIVITIES: CPT

## 2018-05-09 PROCEDURE — 97535 SELF CARE MNGMENT TRAINING: CPT

## 2018-05-09 PROCEDURE — 6370000000 HC RX 637 (ALT 250 FOR IP): Performed by: INTERNAL MEDICINE

## 2018-05-09 PROCEDURE — 1200000000 HC SEMI PRIVATE

## 2018-05-09 PROCEDURE — 83036 HEMOGLOBIN GLYCOSYLATED A1C: CPT

## 2018-05-09 PROCEDURE — 85025 COMPLETE CBC W/AUTO DIFF WBC: CPT

## 2018-05-09 PROCEDURE — 6360000002 HC RX W HCPCS: Performed by: STUDENT IN AN ORGANIZED HEALTH CARE EDUCATION/TRAINING PROGRAM

## 2018-05-09 PROCEDURE — 99024 POSTOP FOLLOW-UP VISIT: CPT | Performed by: ORTHOPAEDIC SURGERY

## 2018-05-09 PROCEDURE — 2580000003 HC RX 258: Performed by: STUDENT IN AN ORGANIZED HEALTH CARE EDUCATION/TRAINING PROGRAM

## 2018-05-09 PROCEDURE — 36415 COLL VENOUS BLD VENIPUNCTURE: CPT

## 2018-05-09 PROCEDURE — 82962 GLUCOSE BLOOD TEST: CPT

## 2018-05-09 PROCEDURE — 6370000000 HC RX 637 (ALT 250 FOR IP): Performed by: STUDENT IN AN ORGANIZED HEALTH CARE EDUCATION/TRAINING PROGRAM

## 2018-05-09 RX ADMIN — HYDROCODONE BITARTRATE AND ACETAMINOPHEN 2 TABLET: 5; 325 TABLET ORAL at 21:26

## 2018-05-09 RX ADMIN — CEFAZOLIN SODIUM 2 G: 2 SOLUTION INTRAVENOUS at 00:05

## 2018-05-09 RX ADMIN — Medication 10 ML: at 21:25

## 2018-05-09 RX ADMIN — DOCUSATE SODIUM 100 MG: 100 CAPSULE, LIQUID FILLED ORAL at 08:25

## 2018-05-09 RX ADMIN — ATENOLOL 25 MG: 25 TABLET ORAL at 21:25

## 2018-05-09 RX ADMIN — INSULIN LISPRO 1 UNITS: 100 INJECTION, SOLUTION INTRAVENOUS; SUBCUTANEOUS at 13:13

## 2018-05-09 RX ADMIN — DOCUSATE SODIUM 100 MG: 100 CAPSULE, LIQUID FILLED ORAL at 21:25

## 2018-05-09 RX ADMIN — DONEPEZIL HYDROCHLORIDE 5 MG: 5 TABLET, FILM COATED ORAL at 21:25

## 2018-05-09 RX ADMIN — STANDARDIZED SENNA CONCENTRATE AND DOCUSATE SODIUM 1 TABLET: 8.6; 5 TABLET, FILM COATED ORAL at 08:25

## 2018-05-09 RX ADMIN — Medication 10 ML: at 08:26

## 2018-05-09 RX ADMIN — HYDROCODONE BITARTRATE AND ACETAMINOPHEN 2 TABLET: 5; 325 TABLET ORAL at 05:22

## 2018-05-09 RX ADMIN — TROSPIUM CHLORIDE 20 MG: 20 TABLET ORAL at 21:25

## 2018-05-09 RX ADMIN — CLOPIDOGREL 75 MG: 75 TABLET, FILM COATED ORAL at 08:24

## 2018-05-09 RX ADMIN — Medication 10 ML: at 00:05

## 2018-05-09 RX ADMIN — INSULIN LISPRO 1 UNITS: 100 INJECTION, SOLUTION INTRAVENOUS; SUBCUTANEOUS at 21:26

## 2018-05-09 RX ADMIN — METFORMIN HYDROCHLORIDE 750 MG: 750 TABLET, EXTENDED RELEASE ORAL at 08:25

## 2018-05-09 RX ADMIN — VITAMIN D, TAB 1000IU (100/BT) 1000 UNITS: 25 TAB at 08:24

## 2018-05-09 RX ADMIN — PYRIDOXINE HCL TAB 50 MG 200 MG: 50 TAB at 08:25

## 2018-05-09 RX ADMIN — ENALAPRIL MALEATE 5 MG: 5 TABLET ORAL at 08:24

## 2018-05-09 RX ADMIN — ATENOLOL 25 MG: 25 TABLET ORAL at 08:24

## 2018-05-09 RX ADMIN — INSULIN LISPRO 2 UNITS: 100 INJECTION, SOLUTION INTRAVENOUS; SUBCUTANEOUS at 09:14

## 2018-05-09 RX ADMIN — INSULIN LISPRO 3 UNITS: 100 INJECTION, SOLUTION INTRAVENOUS; SUBCUTANEOUS at 16:44

## 2018-05-09 RX ADMIN — HYDROCODONE BITARTRATE AND ACETAMINOPHEN 2 TABLET: 5; 325 TABLET ORAL at 13:07

## 2018-05-09 RX ADMIN — ASPIRIN 81 MG: 81 TABLET, COATED ORAL at 08:24

## 2018-05-09 RX ADMIN — LINAGLIPTIN 5 MG: 5 TABLET, FILM COATED ORAL at 08:25

## 2018-05-09 ASSESSMENT — PAIN DESCRIPTION - LOCATION
LOCATION: KNEE
LOCATION: KNEE

## 2018-05-09 ASSESSMENT — PAIN DESCRIPTION - PAIN TYPE
TYPE: SURGICAL PAIN
TYPE: SURGICAL PAIN

## 2018-05-09 ASSESSMENT — PAIN SCALES - GENERAL
PAINLEVEL_OUTOF10: 0
PAINLEVEL_OUTOF10: 9
PAINLEVEL_OUTOF10: 0
PAINLEVEL_OUTOF10: 7
PAINLEVEL_OUTOF10: 0
PAINLEVEL_OUTOF10: 9

## 2018-05-09 ASSESSMENT — PAIN DESCRIPTION - ORIENTATION
ORIENTATION: RIGHT
ORIENTATION: RIGHT

## 2018-05-09 ASSESSMENT — PAIN DESCRIPTION - ONSET: ONSET: AWAKENED FROM SLEEP

## 2018-05-09 ASSESSMENT — PAIN DESCRIPTION - DESCRIPTORS: DESCRIPTORS: ACHING;DISCOMFORT;SORE

## 2018-05-09 ASSESSMENT — PAIN DESCRIPTION - FREQUENCY: FREQUENCY: INTERMITTENT

## 2018-05-10 VITALS
TEMPERATURE: 98.9 F | WEIGHT: 201 LBS | OXYGEN SATURATION: 97 % | RESPIRATION RATE: 15 BRPM | SYSTOLIC BLOOD PRESSURE: 138 MMHG | DIASTOLIC BLOOD PRESSURE: 66 MMHG | BODY MASS INDEX: 36.99 KG/M2 | HEART RATE: 84 BPM | HEIGHT: 62 IN

## 2018-05-10 LAB
BASOPHILS ABSOLUTE: 0.03 E9/L (ref 0–0.2)
BASOPHILS RELATIVE PERCENT: 0.3 % (ref 0–2)
EOSINOPHILS ABSOLUTE: 0.17 E9/L (ref 0.05–0.5)
EOSINOPHILS RELATIVE PERCENT: 1.7 % (ref 0–6)
HCT VFR BLD CALC: 26.6 % (ref 34–48)
HEMOGLOBIN: 8.9 G/DL (ref 11.5–15.5)
IMMATURE GRANULOCYTES #: 0.07 E9/L
IMMATURE GRANULOCYTES %: 0.7 % (ref 0–5)
LYMPHOCYTES ABSOLUTE: 1.68 E9/L (ref 1.5–4)
LYMPHOCYTES RELATIVE PERCENT: 17.2 % (ref 20–42)
MCH RBC QN AUTO: 31 PG (ref 26–35)
MCHC RBC AUTO-ENTMCNC: 33.5 % (ref 32–34.5)
MCV RBC AUTO: 92.7 FL (ref 80–99.9)
METER GLUCOSE: 178 MG/DL (ref 70–110)
MONOCYTES ABSOLUTE: 1.09 E9/L (ref 0.1–0.95)
MONOCYTES RELATIVE PERCENT: 11.2 % (ref 2–12)
NEUTROPHILS ABSOLUTE: 6.72 E9/L (ref 1.8–7.3)
NEUTROPHILS RELATIVE PERCENT: 68.9 % (ref 43–80)
PDW BLD-RTO: 13.5 FL (ref 11.5–15)
PLATELET # BLD: 244 E9/L (ref 130–450)
PMV BLD AUTO: 9.3 FL (ref 7–12)
RBC # BLD: 2.87 E12/L (ref 3.5–5.5)
WBC # BLD: 9.8 E9/L (ref 4.5–11.5)

## 2018-05-10 PROCEDURE — 2580000003 HC RX 258: Performed by: STUDENT IN AN ORGANIZED HEALTH CARE EDUCATION/TRAINING PROGRAM

## 2018-05-10 PROCEDURE — 36415 COLL VENOUS BLD VENIPUNCTURE: CPT

## 2018-05-10 PROCEDURE — 82962 GLUCOSE BLOOD TEST: CPT

## 2018-05-10 PROCEDURE — 85025 COMPLETE CBC W/AUTO DIFF WBC: CPT

## 2018-05-10 PROCEDURE — 6370000000 HC RX 637 (ALT 250 FOR IP): Performed by: INTERNAL MEDICINE

## 2018-05-10 PROCEDURE — 97535 SELF CARE MNGMENT TRAINING: CPT

## 2018-05-10 PROCEDURE — 97530 THERAPEUTIC ACTIVITIES: CPT

## 2018-05-10 PROCEDURE — 6370000000 HC RX 637 (ALT 250 FOR IP): Performed by: STUDENT IN AN ORGANIZED HEALTH CARE EDUCATION/TRAINING PROGRAM

## 2018-05-10 PROCEDURE — 99024 POSTOP FOLLOW-UP VISIT: CPT | Performed by: ORTHOPAEDIC SURGERY

## 2018-05-10 RX ORDER — HYDROCODONE BITARTRATE AND ACETAMINOPHEN 5; 325 MG/1; MG/1
1 TABLET ORAL EVERY 6 HOURS PRN
Qty: 28 TABLET | Refills: 0 | Status: SHIPPED | OUTPATIENT
Start: 2018-05-10 | End: 2018-05-17

## 2018-05-10 RX ORDER — ASPIRIN 81 MG/1
81 TABLET ORAL 2 TIMES DAILY
Qty: 56 TABLET | Refills: 0 | Status: SHIPPED | OUTPATIENT
Start: 2018-05-10 | End: 2018-10-24

## 2018-05-10 RX ADMIN — HYDROCODONE BITARTRATE AND ACETAMINOPHEN 1 TABLET: 5; 325 TABLET ORAL at 13:42

## 2018-05-10 RX ADMIN — DOCUSATE SODIUM 100 MG: 100 CAPSULE, LIQUID FILLED ORAL at 08:53

## 2018-05-10 RX ADMIN — VITAMIN D, TAB 1000IU (100/BT) 1000 UNITS: 25 TAB at 08:51

## 2018-05-10 RX ADMIN — PYRIDOXINE HCL TAB 50 MG 200 MG: 50 TAB at 08:52

## 2018-05-10 RX ADMIN — ASPIRIN 81 MG: 81 TABLET, COATED ORAL at 08:51

## 2018-05-10 RX ADMIN — ENALAPRIL MALEATE 5 MG: 5 TABLET ORAL at 08:51

## 2018-05-10 RX ADMIN — HYDROCODONE BITARTRATE AND ACETAMINOPHEN 2 TABLET: 5; 325 TABLET ORAL at 09:43

## 2018-05-10 RX ADMIN — ATENOLOL 25 MG: 25 TABLET ORAL at 08:51

## 2018-05-10 RX ADMIN — INSULIN LISPRO 1 UNITS: 100 INJECTION, SOLUTION INTRAVENOUS; SUBCUTANEOUS at 08:55

## 2018-05-10 RX ADMIN — METFORMIN HYDROCHLORIDE 750 MG: 750 TABLET, EXTENDED RELEASE ORAL at 08:54

## 2018-05-10 RX ADMIN — CLOPIDOGREL 75 MG: 75 TABLET, FILM COATED ORAL at 08:51

## 2018-05-10 RX ADMIN — STANDARDIZED SENNA CONCENTRATE AND DOCUSATE SODIUM 1 TABLET: 8.6; 5 TABLET, FILM COATED ORAL at 08:53

## 2018-05-10 RX ADMIN — LINAGLIPTIN 5 MG: 5 TABLET, FILM COATED ORAL at 08:51

## 2018-05-10 RX ADMIN — Medication 10 ML: at 08:55

## 2018-05-10 ASSESSMENT — PAIN SCALES - GENERAL
PAINLEVEL_OUTOF10: 4
PAINLEVEL_OUTOF10: 9
PAINLEVEL_OUTOF10: 4
PAINLEVEL_OUTOF10: 6

## 2018-05-10 ASSESSMENT — PAIN DESCRIPTION - PAIN TYPE
TYPE: SURGICAL PAIN
TYPE: SURGICAL PAIN

## 2018-05-10 ASSESSMENT — PAIN DESCRIPTION - ORIENTATION
ORIENTATION: RIGHT
ORIENTATION: RIGHT

## 2018-05-10 ASSESSMENT — PAIN DESCRIPTION - DESCRIPTORS
DESCRIPTORS: ACHING;CONSTANT;DISCOMFORT;SORE
DESCRIPTORS: ACHING;CONSTANT;DISCOMFORT;SORE

## 2018-05-10 ASSESSMENT — PAIN DESCRIPTION - FREQUENCY
FREQUENCY: CONTINUOUS
FREQUENCY: CONTINUOUS

## 2018-05-10 ASSESSMENT — PAIN DESCRIPTION - PROGRESSION
CLINICAL_PROGRESSION: GRADUALLY IMPROVING
CLINICAL_PROGRESSION: GRADUALLY IMPROVING

## 2018-05-10 ASSESSMENT — PAIN DESCRIPTION - LOCATION
LOCATION: KNEE
LOCATION: KNEE

## 2018-05-10 ASSESSMENT — PAIN DESCRIPTION - ONSET
ONSET: ON-GOING
ONSET: ON-GOING

## 2018-05-23 ENCOUNTER — TELEPHONE (OUTPATIENT)
Dept: ORTHOPEDIC SURGERY | Age: 78
End: 2018-05-23

## 2018-05-31 ENCOUNTER — OFFICE VISIT (OUTPATIENT)
Dept: ORTHOPEDIC SURGERY | Age: 78
End: 2018-05-31

## 2018-05-31 ENCOUNTER — HOSPITAL ENCOUNTER (OUTPATIENT)
Dept: ULTRASOUND IMAGING | Age: 78
Discharge: HOME | End: 2018-06-02
Payer: MEDICARE

## 2018-05-31 ENCOUNTER — HOSPITAL ENCOUNTER (OUTPATIENT)
Age: 78
Discharge: HOME | End: 2018-06-02
Payer: MEDICARE

## 2018-05-31 ENCOUNTER — TELEPHONE (OUTPATIENT)
Dept: ORTHOPEDIC SURGERY | Age: 78
End: 2018-05-31

## 2018-05-31 VITALS
SYSTOLIC BLOOD PRESSURE: 137 MMHG | HEART RATE: 67 BPM | DIASTOLIC BLOOD PRESSURE: 66 MMHG | TEMPERATURE: 98.5 F | HEIGHT: 62 IN | BODY MASS INDEX: 36.62 KG/M2 | WEIGHT: 199 LBS

## 2018-05-31 DIAGNOSIS — Z96.651 HISTORY OF TOTAL RIGHT KNEE REPLACEMENT: Primary | ICD-10-CM

## 2018-05-31 DIAGNOSIS — R60.0 LEG EDEMA, RIGHT: ICD-10-CM

## 2018-05-31 DIAGNOSIS — Z96.651 HISTORY OF TOTAL RIGHT KNEE REPLACEMENT: ICD-10-CM

## 2018-05-31 PROCEDURE — 99024 POSTOP FOLLOW-UP VISIT: CPT | Performed by: ORTHOPAEDIC SURGERY

## 2018-05-31 PROCEDURE — 93971 EXTREMITY STUDY: CPT

## 2018-05-31 RX ORDER — LANCETS 28 GAUGE
EACH MISCELLANEOUS
COMMUNITY
Start: 2018-04-18

## 2018-05-31 RX ORDER — METFORMIN HYDROCHLORIDE 750 MG/1
750 TABLET, EXTENDED RELEASE ORAL
COMMUNITY
Start: 2018-04-11

## 2018-07-05 ENCOUNTER — OFFICE VISIT (OUTPATIENT)
Dept: ORTHOPEDIC SURGERY | Age: 78
End: 2018-07-05

## 2018-07-05 VITALS
BODY MASS INDEX: 36.62 KG/M2 | TEMPERATURE: 97 F | HEART RATE: 57 BPM | DIASTOLIC BLOOD PRESSURE: 61 MMHG | HEIGHT: 62 IN | SYSTOLIC BLOOD PRESSURE: 136 MMHG | WEIGHT: 199 LBS

## 2018-07-05 DIAGNOSIS — Z96.651 HISTORY OF TOTAL RIGHT KNEE REPLACEMENT: Primary | ICD-10-CM

## 2018-07-05 PROCEDURE — 99024 POSTOP FOLLOW-UP VISIT: CPT | Performed by: ORTHOPAEDIC SURGERY

## 2018-07-05 NOTE — PROGRESS NOTES
Chief Complaint:   Chief Complaint   Patient presents with    Post-Op Check     post-op check right TKA 5/8/18, swelling is much improved. HPI 8 weeks postop right total knee arthroplasty. Patient states her preoperative pain is much improved. Still has some mild soft tissue discomfort and stiffness. Her increased on chronic edema of the right lower extremity is improved from her last visit. Ultrasound after her last visit was negative for DVT. Patient Active Problem List   Diagnosis    Diabetes mellitus type 2, controlled ([address]    HTN (hypertension), benign    Mixed hyperlipidemia    Primary osteoarthritis of right knee    Obesity (BMI 30-39. 9)       Past Medical History:   Diagnosis Date    ADHD (attention deficit hyperactivity disorder)     Admission for long-term (current) use of medications     Patient states that in her early 43's she had been hospitalized for mental health due to being on 22 prescription medications    Birth defect     bottom of spine?     Depression     Diabetes mellitus ([facility]     Environmental allergies     Hyperlipidemia     Hypertension     pt states BP controlled on meds    Latex allergy     Osteoarthritis     Screening for colon cancer     Tricuspid regurgitation     With No Left Ventricular Dysfunction       Past Surgical History:   Procedure Laterality Date    CARPAL TUNNEL RELEASE  2000 - Right Hand - [facility]    COLONOSCOPY  7/29/2013    HYSTERECTOMY  1984 - [facility]    JOINT REPLACEMENT  2009 - Left Hip - [address] JOINT REPLACEMENT  2012  left knee    JOINT REPLACEMENT  05/08/2018    R total knee arthroplasty    KNEE CARTILAGE SURGERY  1999 - Right Knee - [facility]    IN TOTAL KNEE ARTHROPLASTY Right 5/8/2018    RIGHT KNEE TOTAL ARTHROPLASTY performed by Lucia Bell MD at [place] [address] - [facility] Toe [facility]ail [facility]     TOE SURGERY  2011 - R Big Toe Nail Removal     VEIN SURGERY  3/11/2011 - Capped Vein Right Leg - [facility] Current Outpatient Prescriptions   Medication Sig Dispense Refill    FREESTYLE LANCETS [facility]       ONE TOUCH ULTRA TEST strip       metFORMIN (GLUCOPHAGE-XR) 750 MG extended release tablet Take 750 mg by mouth daily (with breakfast)       aspirin EC 81 MG EC tablet Take 1 tablet by mouth 2 times daily for 28 days (Patient taking differently: Take 81 mg by mouth daily ) 56 tablet 0    POTASSIUM GLUCONATE PO Take 500 mg by mouth daily Does not take      docusate sodium (COLACE) 100 MG capsule Take 100 mg by mouth 2 times daily      solifenacin (VESICARE) 5 MG tablet Take 5 mg by mouth daily      donepezil (ARICEPT) 5 MG tablet Take 5 mg by mouth nightly      clopidogrel (PLAVIX) 75 MG tablet Take 75 mg by mouth daily      sitaGLIPtin (JANUVIA) 100 MG tablet Take 100 mg by mouth daily.  enalapril (VASOTEC) 5 MG tablet Take 5 mg by mouth daily.  colesevelam (WELCHOL) 625 MG tablet Take 625 mg by mouth 2 times daily (with meals) Patient takes 2 tablets in the morning and 1 tablet in the evening      Turmeric 450 MG CAPS Take 1 capsule by mouth daily Instructed to contact dr thacker: pre-op instructions  Not taking at present      atenolol (TENORMIN) 25 MG tablet Take 25 mg by mouth 2 times daily. Instructed to take morning of surgery with a sip of water      CINNAMON PO Take 2,000 mg by mouth daily Instructed to contact dr thacker: pre-op instructions  No longer taking      vitamin E 400 UNIT capsule Take 400 Units by mouth daily Last dose 7-22-13      Cholecalciferol 400 UNIT TABS tablet Take 1,000 Units by mouth daily D3      Pyridoxine HCl (VITAMIN B-6) 100 MG tablet Take 200 mg by mouth daily. Instructed to contact dr thacker: pre-op instructions      vitamin B-12 (CYANOCOBALAMIN) 1000 MCG tablet Take 1,000 mcg by mouth daily.  fish oil-omega-3 fatty acids 1000 MG capsule Take 1 g by mouth daily Last dose 7-22-13       No current facility-administered medications for this visit. overall. Recommend topical antibiotic ointment to the areas of Vicryl suture until resolved. She will let me know if any adverse change in terms of drainage redness or pain. She feels she is independent with her exercise program completing physical therapy. Return in about 2 months (around 9/5/2018) for exam and xray right total knee.        Lynn Solo MD    7/5/2018  1:23 PM

## 2018-07-16 ENCOUNTER — TELEPHONE (OUTPATIENT)
Dept: ORTHOPEDIC SURGERY | Age: 78
End: 2018-07-16

## 2018-07-16 NOTE — TELEPHONE ENCOUNTER
7/16/2018  3:13 pm  Patient phoned office / message left on voice mail: Will be there at 1 tomorrow. Will call front office and let them know.

## 2018-07-16 NOTE — TELEPHONE ENCOUNTER
Follow up phone call to patient's cell phone [phone] unsuccessful d/t phone disconnected. Phone call daughter who reports: patient is at home. Phone call to [phone] / message left on patient's voice mail: See Qamar Pro tomorrow at 1, if you can, call the  here to confirm    Phone call to [phone] / spoke to daughter Blaire Luisa / informed of above. Blaire Moran will inform patient d/t she cannot get to the phone.

## 2018-07-16 NOTE — TELEPHONE ENCOUNTER
Patient phoned office to report: Saw doctor last week. Had a problem with an inflamed stitch. Told to apply Neosporin and a band aid. Everything was going fine until last night. Kept me up all night - burning, incision is beet red from top to bottom. Patient denies fever or chills, drainage from incision. Going to the nursing home today to see brother who is dying. Appointment offered Wednesday, patient declined. stating she has a reunion to go to. Appointment made for July 19 th at 11:15 am. Informed patient message will be sent to [facility], will call patient will any instructions from the doctor.  Patient's cell # [phone] daugther's cell # [phone]

## 2018-07-17 ENCOUNTER — OFFICE VISIT (OUTPATIENT)
Dept: ORTHOPEDIC SURGERY | Age: 78
End: 2018-07-17

## 2018-07-17 DIAGNOSIS — Z96.651 HISTORY OF TOTAL RIGHT KNEE REPLACEMENT: Primary | ICD-10-CM

## 2018-07-17 PROCEDURE — 99024 POSTOP FOLLOW-UP VISIT: CPT | Performed by: ORTHOPAEDIC SURGERY

## 2018-07-17 NOTE — PROGRESS NOTES
independent with her exercises. She will let me know of any adverse change otherwise she will follow-up as scheduled. Return for As scheduled.        Dotty Malagon MD    7/17/2018  1:21 PM

## 2018-09-05 ENCOUNTER — OFFICE VISIT (OUTPATIENT)
Dept: ORTHOPEDIC SURGERY | Age: 78
End: 2018-09-05
Payer: MEDICARE

## 2018-09-05 VITALS
BODY MASS INDEX: 35.26 KG/M2 | HEART RATE: 70 BPM | TEMPERATURE: 97.2 F | SYSTOLIC BLOOD PRESSURE: 122 MMHG | HEIGHT: 63 IN | DIASTOLIC BLOOD PRESSURE: 65 MMHG | WEIGHT: 199 LBS

## 2018-09-05 DIAGNOSIS — Z96.651 HISTORY OF TOTAL RIGHT KNEE REPLACEMENT: Primary | ICD-10-CM

## 2018-09-05 PROCEDURE — 1090F PRES/ABSN URINE INCON ASSESS: CPT | Performed by: ORTHOPAEDIC SURGERY

## 2018-09-05 PROCEDURE — G8400 PT W/DXA NO RESULTS DOC: HCPCS | Performed by: ORTHOPAEDIC SURGERY

## 2018-09-05 PROCEDURE — 1036F TOBACCO NON-USER: CPT | Performed by: ORTHOPAEDIC SURGERY

## 2018-09-05 PROCEDURE — 1101F PT FALLS ASSESS-DOCD LE1/YR: CPT | Performed by: ORTHOPAEDIC SURGERY

## 2018-09-05 PROCEDURE — 4040F PNEUMOC VAC/ADMIN/RCVD: CPT | Performed by: ORTHOPAEDIC SURGERY

## 2018-09-05 PROCEDURE — 1123F ACP DISCUSS/DSCN MKR DOCD: CPT | Performed by: ORTHOPAEDIC SURGERY

## 2018-09-05 PROCEDURE — G8427 DOCREV CUR MEDS BY ELIG CLIN: HCPCS | Performed by: ORTHOPAEDIC SURGERY

## 2018-09-05 PROCEDURE — G8417 CALC BMI ABV UP PARAM F/U: HCPCS | Performed by: ORTHOPAEDIC SURGERY

## 2018-09-05 PROCEDURE — 99213 OFFICE O/P EST LOW 20 MIN: CPT | Performed by: ORTHOPAEDIC SURGERY

## 2018-09-05 NOTE — PROGRESS NOTES
Chief Complaint:   Chief Complaint   Patient presents with    Knee Pain     F/u right TKA 5/8/18. HPI 4 months postop right total knee arthroplasty. Previous left total knee arthroplasty by Dr. Rory Jeter. Patient's right knee arthritic pain is better but she has some posterior discomfort and feels like there is a lump. Feels unsteady and sometimes has to use her cane. Note that she does have a history of prior lumbar disease. On questioning, she went to rehab facility after her total knee had a few home therapy visits but did not do any formal physical therapy afterwards. Her only exercise program recently has been walking. Patient Active Problem List   Diagnosis    Diabetes mellitus type 2, controlled ([facility]    HTN (hypertension), benign    Mixed hyperlipidemia    Primary osteoarthritis of right knee    History of total right knee replacement    Obesity (BMI 30-39. 9)       Past Medical History:   Diagnosis Date    ADHD (attention deficit hyperactivity disorder)     Admission for long-term (current) use of medications     Patient states that in her early 43's she had been hospitalized for mental health due to being on 22 prescription medications    Birth defect     bottom of spine?     Depression     Diabetes mellitus ([facility] [facility]     Environmental allergies     Hyperlipidemia     Hypertension     pt states BP controlled on meds    Latex allergy     Osteoarthritis     Screening for colon cancer     Tricuspid regurgitation     With No Left Ventricular Dysfunction       Past Surgical History:   Procedure Laterality Date    CARPAL TUNNEL RELEASE  [facility] - [facility]    COLONOSCOPY  7/29/2013    [facility] - [facility]    JOINT REPLACEMENT  2009 - Left Hip - [address] JOINT REPLACEMENT  2012  left knee    JOINT REPLACEMENT  05/08/2018    R total knee arthroplasty    KNEE CARTILAGE SURGERY  1999 - [facility] Knee - [facility]    KS TOTAL KNEE ARTHROPLASTY Right 5/8/2018    RIGHT regular. Abdomen soft and nontender. Skin warm and dry. XRAY: AP and lateral x-ray views right knee today. Cemented total knee arthroplasty components with good fit and alignment no lucency subsidence or other complicating feature. Impression: Intact right total knee arthroplasty in good alignment. ASSESSMENT/PLAN:    Ramirez Barriga was seen today for knee pain. Diagnoses and all orders for this visit:    History of total right knee replacement  -     XR KNEE RIGHT (1-2 VIEWS); Future  -     Ambulatory referral to Physical Therapy     Objective findings of her right knee appears satisfactory. Functionally I reminded her expect improvement for up to one year postop. It is important for her to be on a consistent focused right knee exercise program to improve her strength, balance, and to work out soft tissue pain that seems to be related to her hamstrings. Return in about 6 weeks (around 10/17/2018).        Hoang Beard MD    9/5/2018  4:16 PM

## 2018-09-06 ENCOUNTER — EVALUATION (OUTPATIENT)
Dept: PHYSICAL THERAPY | Age: 78
End: 2018-09-06
Payer: MEDICARE

## 2018-09-06 DIAGNOSIS — R26.2 DIFFICULTY WALKING: ICD-10-CM

## 2018-09-06 DIAGNOSIS — M62.551 MUSCLE WASTING AND ATROPHY, NOT ELSEWHERE CLASSIFIED, RIGHT THIGH: ICD-10-CM

## 2018-09-06 DIAGNOSIS — Z96.651 HISTORY OF TOTAL RIGHT KNEE REPLACEMENT: Primary | ICD-10-CM

## 2018-09-06 DIAGNOSIS — M25.661 LIMITATION OF JOINT MOTION OF KNEE, RIGHT: ICD-10-CM

## 2018-09-06 PROCEDURE — G8979 MOBILITY GOAL STATUS: HCPCS | Performed by: PHYSICAL THERAPIST

## 2018-09-06 PROCEDURE — 97161 PT EVAL LOW COMPLEX 20 MIN: CPT | Performed by: PHYSICAL THERAPIST

## 2018-09-06 PROCEDURE — G8978 MOBILITY CURRENT STATUS: HCPCS | Performed by: PHYSICAL THERAPIST

## 2018-09-06 PROCEDURE — 97535 SELF CARE MNGMENT TRAINING: CPT | Performed by: PHYSICAL THERAPIST

## 2018-09-06 PROCEDURE — 97110 THERAPEUTIC EXERCISES: CPT | Performed by: PHYSICAL THERAPIST

## 2018-09-11 ENCOUNTER — TREATMENT (OUTPATIENT)
Dept: PHYSICAL THERAPY | Age: 78
End: 2018-09-11
Payer: MEDICARE

## 2018-09-11 DIAGNOSIS — R26.2 DIFFICULTY WALKING: ICD-10-CM

## 2018-09-11 DIAGNOSIS — M25.661 LIMITATION OF JOINT MOTION OF KNEE, RIGHT: ICD-10-CM

## 2018-09-11 DIAGNOSIS — M62.551 MUSCLE WASTING AND ATROPHY, NOT ELSEWHERE CLASSIFIED, RIGHT THIGH: ICD-10-CM

## 2018-09-11 DIAGNOSIS — Z96.651 HISTORY OF TOTAL RIGHT KNEE REPLACEMENT: Primary | ICD-10-CM

## 2018-09-11 PROCEDURE — 97530 THERAPEUTIC ACTIVITIES: CPT | Performed by: PHYSICAL THERAPIST

## 2018-09-11 PROCEDURE — 97112 NEUROMUSCULAR REEDUCATION: CPT | Performed by: PHYSICAL THERAPIST

## 2018-09-11 PROCEDURE — 97110 THERAPEUTIC EXERCISES: CPT | Performed by: PHYSICAL THERAPIST

## 2018-09-11 NOTE — PROGRESS NOTES
will      be discharged to HEP. [x] Tolerated today's therapy session with: fatigue  [] Comment:    Pt. Education:  [] Yes  [] No                               []   Reviewed Prior HEP/Ed       []  Instructed in and practiced updated HEP. [x]  Other:  PT advised her to make sure she is stretching her knee 1x/day to get optimal results for improving knee ROM     Method of Education: [] Verbal  [] Demo  [] Written    Comprehension of Education:  [] Verbalizes understanding. [] Demonstrates understanding. [] Needs review. [] Demonstrates/verbalizes HEP/Ed previously given. PLAN          [] Re-evaluate next session. [x] Continue per plan of care. [] Progress to discharge/HEP over the next ____ visits. [] Hold skilled PT d/t:    [] Discharge from skilled PT services                [] Advance:    as tolerated  [] Discontinue:     due to:     [x] Monitor between session response to: advanced exercise.     Electronically signed by:  Eliezer Suarez, [address] [place] [address]

## 2018-09-13 ENCOUNTER — TREATMENT (OUTPATIENT)
Dept: PHYSICAL THERAPY | Age: 78
End: 2018-09-13
Payer: MEDICARE

## 2018-09-13 DIAGNOSIS — Z96.651 HISTORY OF TOTAL RIGHT KNEE REPLACEMENT: Primary | ICD-10-CM

## 2018-09-13 DIAGNOSIS — M62.551 MUSCLE WASTING AND ATROPHY, NOT ELSEWHERE CLASSIFIED, RIGHT THIGH: ICD-10-CM

## 2018-09-13 DIAGNOSIS — R26.2 DIFFICULTY WALKING: ICD-10-CM

## 2018-09-13 DIAGNOSIS — M25.661 LIMITATION OF JOINT MOTION OF KNEE, RIGHT: ICD-10-CM

## 2018-09-13 PROCEDURE — 97530 THERAPEUTIC ACTIVITIES: CPT | Performed by: PHYSICAL THERAPIST

## 2018-09-13 PROCEDURE — 97112 NEUROMUSCULAR REEDUCATION: CPT | Performed by: PHYSICAL THERAPIST

## 2018-09-13 PROCEDURE — 97110 THERAPEUTIC EXERCISES: CPT | Performed by: PHYSICAL THERAPIST

## 2018-09-13 NOTE — PROGRESS NOTES
PHYSICAL THERAPY DAILY TREATMENT NOTE    Date:  2018   Patient: Maxim Mayo  : 1940  MRN: [id number]  Physician: Hakan Turner MD  [address] [place] [address]       Dx: Medical Diagnosis: V51.225 (ICD-10-CM) - History of total right knee replacement    Treatment Diagnosis: R26.2, m62.551, M25.661        Area treated: R knee    SUBJECTIVE     Pain:  [] Yes  [] No Location:   Pain Rating: (0-10 scale) /10    Daily Comments: Reports knee still pulls for time to time, but she is sleeping comfortably the last couple of nights. She expressed concern for her copay cost.         OBJECTIVE     See rehab sheet for today's treatment    Time In:  4:00            Time Out: 4:55             Visit #: 3    Treatment Charges:   Mins Units   Modalities       Ther Exercise   37 2   Neuromuscular Re-Education   6 1   Thera Activities   12 1   Manual Therapy       ADL/Home Mgt       Gait Training       Group Therapy       Non-Billable Service Time       Other     Total Time/Units 55 4     [] Group therapy/ non-billed for portions of treatment that were considered simultaneous activities per certain provider guidelines. [] Pre/post/intraservice treatment measurements included in treatment:    []   Ther Ex                                                                                                                          []   Manual therapy                                                                                                           []   Other:    Palpation:  ROM:  Strength:  Joint Integrity:    ASSESSMENT       [] Progressing toward goals. [] No change  [x] Patient required verbal/tactile cueing for prompting/correction for: step ups and hip hinge/sequencing for sit to stand. [] Patient demonstrates ability to progress intensity for the following therapeutic procedures per rehab sheet.   [] Patient demonstrates independence in procedures indicated on rehab sheet and will      be

## 2018-09-20 ENCOUNTER — TREATMENT (OUTPATIENT)
Dept: PHYSICAL THERAPY | Age: 78
End: 2018-09-20

## 2018-09-20 DIAGNOSIS — R26.2 DIFFICULTY WALKING: ICD-10-CM

## 2018-09-20 DIAGNOSIS — Z96.651 HISTORY OF TOTAL RIGHT KNEE REPLACEMENT: Primary | ICD-10-CM

## 2018-09-20 DIAGNOSIS — M62.551 MUSCLE WASTING AND ATROPHY, NOT ELSEWHERE CLASSIFIED, RIGHT THIGH: ICD-10-CM

## 2018-09-20 NOTE — PROGRESS NOTES
goals.    [] No change  [x] Patient required verbal/tactile cueing for prompting/correction for:  [] Patient demonstrates ability to progress intensity for the following therapeutic procedures per rehab sheet. [] Patient demonstrates independence in procedures indicated on rehab sheet and will      be discharged to HEP. [] Tolerated today's therapy session with:  [] Comment:    Pt. Education:  [x] Yes  [] No                               []   Reviewed Prior HEP/Ed       [x]  Instructed in and practiced updated HEP. []  Other:    Method of Education: [x] Verbal  [] Demo  [x] Written    Comprehension of Education:  [] Verbalizes understanding. [x] Demonstrates understanding. [] Needs review. [] Demonstrates/verbalizes HEP/Ed previously given. PLAN      progress intrinsic core stability with L/S neutral positioning     [] Re-evaluate next session. [x] Continue per plan of care. [] Progress to discharge/HEP over the next ____ visits.   [] Hold skilled PT d/t:    [] Discharge from skilled PT services                [] Advance:    as tolerated  [] Discontinue:     due to:     [] Monitor between session response to:      Electronically signed by:  Angelo Beach, PT

## 2018-09-25 ENCOUNTER — TREATMENT (OUTPATIENT)
Dept: PHYSICAL THERAPY | Age: 78
End: 2018-09-25
Payer: MEDICARE

## 2018-09-25 ENCOUNTER — HOSPITAL ENCOUNTER (OUTPATIENT)
Dept: ULTRASOUND IMAGING | Age: 78
Discharge: HOME | End: 2018-09-27
Payer: MEDICARE

## 2018-09-25 DIAGNOSIS — Z96.651 HISTORY OF TOTAL RIGHT KNEE REPLACEMENT: Primary | ICD-10-CM

## 2018-09-25 DIAGNOSIS — R94.4 ABNORMAL RESULTS OF KIDNEY FUNCTION STUDIES: ICD-10-CM

## 2018-09-25 DIAGNOSIS — R26.2 DIFFICULTY WALKING: ICD-10-CM

## 2018-09-25 DIAGNOSIS — M25.661 LIMITATION OF JOINT MOTION OF KNEE, RIGHT: ICD-10-CM

## 2018-09-25 DIAGNOSIS — M62.551 MUSCLE WASTING AND ATROPHY, NOT ELSEWHERE CLASSIFIED, RIGHT THIGH: ICD-10-CM

## 2018-09-25 PROCEDURE — 76770 US EXAM ABDO BACK WALL COMP: CPT

## 2018-09-25 PROCEDURE — 97110 THERAPEUTIC EXERCISES: CPT

## 2018-09-25 PROCEDURE — 97530 THERAPEUTIC ACTIVITIES: CPT

## 2018-09-25 NOTE — PROGRESS NOTES
HEP/Ed previously given. PLAN         [x] Continue per plan of care. [] Hold  [] Discharge from skilled PT services                [] Advance:    as tolerated  [] Discontinue:     due to:     [x] Monitor between session response to: progressed exercise.     Electronically signed by: Henry Arnold, [address] [place] [address]

## 2018-09-27 ENCOUNTER — TREATMENT (OUTPATIENT)
Dept: PHYSICAL THERAPY | Age: 78
End: 2018-09-27
Payer: MEDICARE

## 2018-09-27 DIAGNOSIS — M62.551 MUSCLE WASTING AND ATROPHY, NOT ELSEWHERE CLASSIFIED, RIGHT THIGH: ICD-10-CM

## 2018-09-27 DIAGNOSIS — Z96.651 HISTORY OF TOTAL RIGHT KNEE REPLACEMENT: Primary | ICD-10-CM

## 2018-09-27 DIAGNOSIS — R26.2 DIFFICULTY WALKING: ICD-10-CM

## 2018-09-27 DIAGNOSIS — M25.661 LIMITATION OF JOINT MOTION OF KNEE, RIGHT: ICD-10-CM

## 2018-09-27 PROCEDURE — 97112 NEUROMUSCULAR REEDUCATION: CPT

## 2018-09-27 PROCEDURE — 97110 THERAPEUTIC EXERCISES: CPT

## 2018-10-02 ENCOUNTER — TREATMENT (OUTPATIENT)
Dept: PHYSICAL THERAPY | Age: 78
End: 2018-10-02
Payer: MEDICARE

## 2018-10-02 DIAGNOSIS — M25.661 LIMITATION OF JOINT MOTION OF KNEE, RIGHT: ICD-10-CM

## 2018-10-02 DIAGNOSIS — R26.2 DIFFICULTY WALKING: ICD-10-CM

## 2018-10-02 DIAGNOSIS — Z96.651 HISTORY OF TOTAL RIGHT KNEE REPLACEMENT: Primary | ICD-10-CM

## 2018-10-02 DIAGNOSIS — M62.551 MUSCLE WASTING AND ATROPHY, NOT ELSEWHERE CLASSIFIED, RIGHT THIGH: ICD-10-CM

## 2018-10-02 PROCEDURE — 97110 THERAPEUTIC EXERCISES: CPT

## 2018-10-02 PROCEDURE — 97530 THERAPEUTIC ACTIVITIES: CPT

## 2018-10-11 ENCOUNTER — TREATMENT (OUTPATIENT)
Dept: PHYSICAL THERAPY | Age: 78
End: 2018-10-11
Payer: MEDICARE

## 2018-10-11 DIAGNOSIS — M25.661 LIMITATION OF JOINT MOTION OF KNEE, RIGHT: ICD-10-CM

## 2018-10-11 DIAGNOSIS — Z96.651 HISTORY OF TOTAL RIGHT KNEE REPLACEMENT: Primary | ICD-10-CM

## 2018-10-11 DIAGNOSIS — R26.2 DIFFICULTY WALKING: ICD-10-CM

## 2018-10-11 DIAGNOSIS — M62.551 MUSCLE WASTING AND ATROPHY, NOT ELSEWHERE CLASSIFIED, RIGHT THIGH: ICD-10-CM

## 2018-10-11 PROCEDURE — G8980 MOBILITY D/C STATUS: HCPCS | Performed by: PHYSICAL THERAPIST

## 2018-10-11 PROCEDURE — G8981 BODY POS CURRENT STATUS: HCPCS | Performed by: PHYSICAL THERAPIST

## 2018-10-11 PROCEDURE — 97110 THERAPEUTIC EXERCISES: CPT | Performed by: PHYSICAL THERAPIST

## 2018-10-11 PROCEDURE — 97530 THERAPEUTIC ACTIVITIES: CPT | Performed by: PHYSICAL THERAPIST

## 2018-10-11 PROCEDURE — 97164 PT RE-EVAL EST PLAN CARE: CPT | Performed by: PHYSICAL THERAPIST

## 2018-10-11 NOTE — PROGRESS NOTES
unremarkable    Palpation: Unremarkable    Joint Integrity:  stiff flexion end-feel      Joint/Motion Active  Right ROM  Left  Passive  Right ROM  Left  Strength  Right Strength  Left    Hip IR          °         °                °                °       Hip ER ° °  ° °       Hip Flexion ° °  ° °  5 5    Hip Abduction ° °  ° °  3+ 5    Hip Extension ° °  ° °  Grossly 3/5 Grossly 3/5    Knee Flexion 107° 110°  ° °       Knee Extension -5° -5°  ° °  4 4+    Ankle DF ° °  ° °       Ankle Inv            Ankle Ev            Ankle PF ° °  ° °  3+ 3+        Neurological: neg    Gait:  Independent w/ SPC with step through gait. Special Tests/Functional Screening Tools: TU\"    ASSESSMENT     Comments: Patient is approaching some goals, but met goals as well. She is doing very well and understands her HEP. She feels she is ready to stop coming to PT and wishes to continue with HEP. PT is satisfied with progress at this time and believes she will continue to do well if she continues to be consistent with HEP. Physical Therapy Recommendations:  [] Continue skilled PT for ()                                                                    [x] Discharge skilled PT                         [] Hold PT d/t  ()  LTG: (to be met in 10 visits)  1. ? Pain: 0-3/10-MET  2. ? ROM: 110deg flexion-Excellent progress  3. ? Strength: 4+ to 5/5 quad, hip flexion and abduction-Good progress. 3+ to 4/5 hip extension. MET  4. ? Function:  Sit to stand on one attempt out of bed/chair. -MET  Descending steps out of bus in normal fashion modified independence with cane and HR-MET,  Ambulate with SPC comfortably 150-200ft to walk in community for needs comfortably. -MET  5.  Independent with Home Exercise Programs-MET      PLAN     Rehab Potential:   [x] Good  [] Fair  [] Poor   Suggested Professional Referral: [x] No  [] Yes:  Barriers to Goal Achievement[de-identified]   [x] No  [] Yes:  Domestic Concerns:     [x] No  [] Yes:    Patient

## 2018-10-24 ENCOUNTER — OFFICE VISIT (OUTPATIENT)
Dept: ORTHOPEDIC SURGERY | Age: 78
End: 2018-10-24
Payer: MEDICARE

## 2018-10-24 VITALS
DIASTOLIC BLOOD PRESSURE: 68 MMHG | HEART RATE: 65 BPM | BODY MASS INDEX: 35.26 KG/M2 | SYSTOLIC BLOOD PRESSURE: 132 MMHG | TEMPERATURE: 97.7 F | WEIGHT: 199 LBS | HEIGHT: 63 IN

## 2018-10-24 DIAGNOSIS — Z96.651 HISTORY OF TOTAL RIGHT KNEE REPLACEMENT: Primary | ICD-10-CM

## 2018-10-24 PROCEDURE — 99212 OFFICE O/P EST SF 10 MIN: CPT | Performed by: ORTHOPAEDIC SURGERY

## 2018-10-24 PROCEDURE — G8427 DOCREV CUR MEDS BY ELIG CLIN: HCPCS | Performed by: ORTHOPAEDIC SURGERY

## 2018-10-24 PROCEDURE — G8400 PT W/DXA NO RESULTS DOC: HCPCS | Performed by: ORTHOPAEDIC SURGERY

## 2018-10-24 PROCEDURE — 1123F ACP DISCUSS/DSCN MKR DOCD: CPT | Performed by: ORTHOPAEDIC SURGERY

## 2018-10-24 PROCEDURE — G8417 CALC BMI ABV UP PARAM F/U: HCPCS | Performed by: ORTHOPAEDIC SURGERY

## 2018-10-24 PROCEDURE — 1036F TOBACCO NON-USER: CPT | Performed by: ORTHOPAEDIC SURGERY

## 2018-10-24 PROCEDURE — 4040F PNEUMOC VAC/ADMIN/RCVD: CPT | Performed by: ORTHOPAEDIC SURGERY

## 2018-10-24 PROCEDURE — 1101F PT FALLS ASSESS-DOCD LE1/YR: CPT | Performed by: ORTHOPAEDIC SURGERY

## 2018-10-24 PROCEDURE — 1090F PRES/ABSN URINE INCON ASSESS: CPT | Performed by: ORTHOPAEDIC SURGERY

## 2018-10-24 PROCEDURE — G8484 FLU IMMUNIZE NO ADMIN: HCPCS | Performed by: ORTHOPAEDIC SURGERY

## 2019-04-17 ENCOUNTER — HOSPITAL ENCOUNTER (OUTPATIENT)
Dept: GENERAL RADIOLOGY | Age: 79
Discharge: HOME | End: 2019-04-19
Payer: MEDICARE

## 2019-04-17 ENCOUNTER — HOSPITAL ENCOUNTER (OUTPATIENT)
Dept: ULTRASOUND IMAGING | Age: 79
Discharge: HOME | End: 2019-04-19
Payer: MEDICARE

## 2019-04-17 ENCOUNTER — HOSPITAL ENCOUNTER (OUTPATIENT)
Age: 79
Discharge: HOME | End: 2019-04-19
Payer: MEDICARE

## 2019-04-17 DIAGNOSIS — M25.531 RIGHT WRIST PAIN: ICD-10-CM

## 2019-04-17 DIAGNOSIS — R60.9 EDEMA, UNSPECIFIED TYPE: ICD-10-CM

## 2019-04-17 PROCEDURE — 93970 EXTREMITY STUDY: CPT

## 2019-04-17 PROCEDURE — 73110 X-RAY EXAM OF WRIST: CPT

## 2019-07-30 ENCOUNTER — HOSPITAL ENCOUNTER (OUTPATIENT)
Dept: MRI IMAGING | Age: 79
Discharge: HOME | End: 2019-08-01
Payer: MEDICARE

## 2019-07-30 DIAGNOSIS — E11.42 TYPE 2 DIABETES MELLITUS WITH POLYNEUROPATHY (HCC): ICD-10-CM

## 2019-07-30 DIAGNOSIS — H57.12 OCULAR PAIN, LEFT: ICD-10-CM

## 2019-07-30 DIAGNOSIS — R42 DIZZINESS AND GIDDINESS: ICD-10-CM

## 2019-07-30 PROCEDURE — 70553 MRI BRAIN STEM W/O & W/DYE: CPT

## 2019-07-30 PROCEDURE — 2580000003 HC RX 258: Performed by: RADIOLOGY

## 2019-07-30 PROCEDURE — 6360000004 HC RX CONTRAST MEDICATION: Performed by: RADIOLOGY

## 2019-07-30 PROCEDURE — A9577 INJ MULTIHANCE: HCPCS | Performed by: RADIOLOGY

## 2019-07-30 RX ORDER — SODIUM CHLORIDE 0.9 % (FLUSH) 0.9 %
10 SYRINGE (ML) INJECTION 2 TIMES DAILY
Status: DISCONTINUED | OUTPATIENT
Start: 2019-07-30 | End: 2019-08-02 | Stop reason: HOSPADM

## 2019-07-30 RX ADMIN — Medication 10 ML: at 16:45

## 2019-07-30 RX ADMIN — GADOBENATE DIMEGLUMINE 19 ML: 529 INJECTION, SOLUTION INTRAVENOUS at 16:45

## 2019-10-25 ENCOUNTER — HOSPITAL ENCOUNTER (OUTPATIENT)
Age: 79
Discharge: HOME | End: 2019-10-27
Payer: MEDICARE

## 2019-10-25 ENCOUNTER — HOSPITAL ENCOUNTER (OUTPATIENT)
Dept: MAMMOGRAPHY | Age: 79
Discharge: HOME | End: 2019-10-27
Payer: MEDICARE

## 2019-10-25 DIAGNOSIS — Z12.31 VISIT FOR SCREENING MAMMOGRAM: ICD-10-CM

## 2019-10-25 PROCEDURE — 77063 BREAST TOMOSYNTHESIS BI: CPT

## 2020-01-17 ENCOUNTER — HOSPITAL ENCOUNTER (EMERGENCY)
Age: 80
Discharge: HOME | End: 2020-01-18
Payer: MEDICARE

## 2020-01-17 ENCOUNTER — APPOINTMENT (OUTPATIENT)
Dept: GENERAL RADIOLOGY | Age: 80
End: 2020-01-17
Payer: MEDICARE

## 2020-01-17 ENCOUNTER — APPOINTMENT (OUTPATIENT)
Dept: CT IMAGING | Age: 80
End: 2020-01-17
Payer: MEDICARE

## 2020-01-17 LAB
ALBUMIN SERPL-MCNC: 4.7 G/DL (ref 3.5–5.2)
ALP BLD-CCNC: 56 U/L (ref 35–104)
ALT SERPL-CCNC: 9 U/L (ref 0–32)
ANION GAP SERPL CALCULATED.3IONS-SCNC: 12 MMOL/L (ref 7–16)
ANION GAP SERPL CALCULATED.3IONS-SCNC: 17 MMOL/L (ref 7–16)
AST SERPL-CCNC: 10 U/L (ref 0–31)
BACTERIA: ABNORMAL /HPF
BILIRUB SERPL-MCNC: 0.5 MG/DL (ref 0–1.2)
BILIRUBIN URINE: ABNORMAL
BLOOD, URINE: NEGATIVE
BUN BLDV-MCNC: 30 MG/DL (ref 8–23)
BUN BLDV-MCNC: 31 MG/DL (ref 8–23)
CALCIUM SERPL-MCNC: 10.1 MG/DL (ref 8.6–10.2)
CALCIUM SERPL-MCNC: 9.3 MG/DL (ref 8.6–10.2)
CHLORIDE BLD-SCNC: 103 MMOL/L (ref 98–107)
CHLORIDE BLD-SCNC: 106 MMOL/L (ref 98–107)
CLARITY: CLEAR
CO2: 17 MMOL/L (ref 22–29)
CO2: 18 MMOL/L (ref 22–29)
COLOR: YELLOW
CREAT SERPL-MCNC: 1.6 MG/DL (ref 0.5–1)
CREAT SERPL-MCNC: 1.7 MG/DL (ref 0.5–1)
EKG ATRIAL RATE: 73 BPM
EKG P AXIS: 9 DEGREES
EKG P-R INTERVAL: 140 MS
EKG Q-T INTERVAL: 392 MS
EKG QRS DURATION: 86 MS
EKG QTC CALCULATION (BAZETT): 431 MS
EKG R AXIS: 26 DEGREES
EKG T AXIS: 25 DEGREES
EKG VENTRICULAR RATE: 73 BPM
EPITHELIAL CELLS, UA: ABNORMAL /HPF
GFR AFRICAN AMERICAN: 35
GFR AFRICAN AMERICAN: 38
GFR NON-AFRICAN AMERICAN: 29 ML/MIN/1.73
GFR NON-AFRICAN AMERICAN: 31 ML/MIN/1.73
GLUCOSE BLD-MCNC: 155 MG/DL (ref 74–99)
GLUCOSE BLD-MCNC: 182 MG/DL (ref 74–99)
GLUCOSE URINE: NEGATIVE MG/DL
HCT VFR BLD CALC: 36 % (ref 34–48)
HEMOGLOBIN: 11.7 G/DL (ref 11.5–15.5)
KETONES, URINE: NEGATIVE MG/DL
LACTIC ACID, SEPSIS: 2 MMOL/L (ref 0.5–1.9)
LEUKOCYTE ESTERASE, URINE: ABNORMAL
LIPASE: 18 U/L (ref 13–60)
MCH RBC QN AUTO: 30.5 PG (ref 26–35)
MCHC RBC AUTO-ENTMCNC: 32.5 % (ref 32–34.5)
MCV RBC AUTO: 93.8 FL (ref 80–99.9)
NITRITE, URINE: NEGATIVE
PDW BLD-RTO: 13.2 FL (ref 11.5–15)
PH UA: 5 (ref 5–9)
PLATELET # BLD: 307 E9/L (ref 130–450)
PMV BLD AUTO: 9.5 FL (ref 7–12)
POTASSIUM SERPL-SCNC: 5 MMOL/L (ref 3.5–5)
POTASSIUM SERPL-SCNC: 5.2 MMOL/L (ref 3.5–5)
PROTEIN UA: NEGATIVE MG/DL
RBC # BLD: 3.84 E12/L (ref 3.5–5.5)
RBC UA: ABNORMAL /HPF (ref 0–2)
SODIUM BLD-SCNC: 135 MMOL/L (ref 132–146)
SODIUM BLD-SCNC: 138 MMOL/L (ref 132–146)
SPECIFIC GRAVITY UA: >=1.03 (ref 1–1.03)
TOTAL PROTEIN: 8.2 G/DL (ref 6.4–8.3)
TROPONIN: <0.01 NG/ML (ref 0–0.03)
TROPONIN: <0.01 NG/ML (ref 0–0.03)
UROBILINOGEN, URINE: 0.2 E.U./DL
WBC # BLD: 12.6 E9/L (ref 4.5–11.5)
WBC UA: ABNORMAL /HPF (ref 0–5)

## 2020-01-17 PROCEDURE — 99285 EMERGENCY DEPT VISIT HI MDM: CPT

## 2020-01-17 PROCEDURE — 74176 CT ABD & PELVIS W/O CONTRAST: CPT

## 2020-01-17 PROCEDURE — 80048 BASIC METABOLIC PNL TOTAL CA: CPT

## 2020-01-17 PROCEDURE — 83605 ASSAY OF LACTIC ACID: CPT

## 2020-01-17 PROCEDURE — 93010 ELECTROCARDIOGRAM REPORT: CPT | Performed by: INTERNAL MEDICINE

## 2020-01-17 PROCEDURE — 2500000003 HC RX 250 WO HCPCS: Performed by: NURSE PRACTITIONER

## 2020-01-17 PROCEDURE — 96365 THER/PROPH/DIAG IV INF INIT: CPT

## 2020-01-17 PROCEDURE — 80053 COMPREHEN METABOLIC PANEL: CPT

## 2020-01-17 PROCEDURE — 6360000002 HC RX W HCPCS: Performed by: NURSE PRACTITIONER

## 2020-01-17 PROCEDURE — 96375 TX/PRO/DX INJ NEW DRUG ADDON: CPT

## 2020-01-17 PROCEDURE — 36415 COLL VENOUS BLD VENIPUNCTURE: CPT

## 2020-01-17 PROCEDURE — 71046 X-RAY EXAM CHEST 2 VIEWS: CPT

## 2020-01-17 PROCEDURE — 83690 ASSAY OF LIPASE: CPT

## 2020-01-17 PROCEDURE — 93005 ELECTROCARDIOGRAM TRACING: CPT | Performed by: NURSE PRACTITIONER

## 2020-01-17 PROCEDURE — 85027 COMPLETE CBC AUTOMATED: CPT

## 2020-01-17 PROCEDURE — 2580000003 HC RX 258: Performed by: NURSE PRACTITIONER

## 2020-01-17 PROCEDURE — 84484 ASSAY OF TROPONIN QUANT: CPT

## 2020-01-17 PROCEDURE — 81001 URINALYSIS AUTO W/SCOPE: CPT

## 2020-01-17 RX ORDER — ONDANSETRON 4 MG/1
4 TABLET, ORALLY DISINTEGRATING ORAL EVERY 8 HOURS PRN
Qty: 24 TABLET | Refills: 0 | Status: SHIPPED | OUTPATIENT
Start: 2020-01-17

## 2020-01-17 RX ORDER — ONDANSETRON 2 MG/ML
4 INJECTION INTRAMUSCULAR; INTRAVENOUS ONCE
Status: COMPLETED | OUTPATIENT
Start: 2020-01-17 | End: 2020-01-17

## 2020-01-17 RX ORDER — 0.9 % SODIUM CHLORIDE 0.9 %
1000 INTRAVENOUS SOLUTION INTRAVENOUS ONCE
Status: COMPLETED | OUTPATIENT
Start: 2020-01-17 | End: 2020-01-17

## 2020-01-17 RX ORDER — CEPHALEXIN 250 MG/1
250 CAPSULE ORAL 2 TIMES DAILY
Qty: 20 CAPSULE | Refills: 0 | Status: SHIPPED | OUTPATIENT
Start: 2020-01-17 | End: 2020-01-27

## 2020-01-17 RX ADMIN — SODIUM CHLORIDE 1000 ML: 9 INJECTION, SOLUTION INTRAVENOUS at 19:34

## 2020-01-17 RX ADMIN — FAMOTIDINE 20 MG: 10 INJECTION INTRAVENOUS at 19:33

## 2020-01-17 RX ADMIN — CEFTRIAXONE SODIUM 1 G: 1 INJECTION, POWDER, FOR SOLUTION INTRAMUSCULAR; INTRAVENOUS at 22:13

## 2020-01-17 RX ADMIN — ONDANSETRON 4 MG: 2 INJECTION INTRAMUSCULAR; INTRAVENOUS at 19:33

## 2020-01-17 ASSESSMENT — PAIN DESCRIPTION - PAIN TYPE: TYPE: ACUTE PAIN

## 2020-01-17 ASSESSMENT — PAIN DESCRIPTION - ORIENTATION: ORIENTATION: RIGHT;MID

## 2020-01-17 ASSESSMENT — PAIN DESCRIPTION - FREQUENCY: FREQUENCY: INTERMITTENT

## 2020-01-17 ASSESSMENT — PAIN DESCRIPTION - DESCRIPTORS: DESCRIPTORS: BURNING

## 2020-01-17 ASSESSMENT — PAIN DESCRIPTION - ONSET: ONSET: ON-GOING

## 2020-01-17 ASSESSMENT — PAIN SCALES - GENERAL: PAINLEVEL_OUTOF10: 7

## 2020-01-17 ASSESSMENT — PAIN DESCRIPTION - LOCATION: LOCATION: CHEST

## 2020-01-17 NOTE — ED NOTES
Recheck patient vitals, again updated on bed status @ this time     Janusz Jarquin LPN  03/20/87 2800

## 2020-01-18 VITALS
HEIGHT: 60 IN | HEART RATE: 74 BPM | RESPIRATION RATE: 18 BRPM | DIASTOLIC BLOOD PRESSURE: 78 MMHG | BODY MASS INDEX: 39.15 KG/M2 | SYSTOLIC BLOOD PRESSURE: 140 MMHG | WEIGHT: 199.4 LBS | TEMPERATURE: 98 F | OXYGEN SATURATION: 97 %

## 2020-01-18 NOTE — ED PROVIDER NOTES
Independent  HPI:  1/17/20, Time: 7:56 PM         Edwin Leal is a 67 y.o. female presenting to the ED for nausea, vomiting, diarrhea. Patient reports that she has been having this since Wednesday. Patient reports that vomiting and diarrhea actually subsided today. Reports no further episodes of vomiting or diarrhea at all today. States that prior to that she has been having greater than 3-5 episodes each and every day. Patient reports that yesterday started having heartburn type sensation from throwing up so much. Patient reports no hematemesis or hematochezia. Does complain of some mild lower abdominal pain more noticeable to the left lower quadrant. Patient does live in an assisted living facility. She is unaware of any illness that is going around. She denies feeling feverish denies any chest pain denies any shortness of breath at this time now. Does expressed more heartburn related symptoms from the emesis. Patient denies any difficulty with urination. Review of Systems:   Pertinent positives and negatives are stated within HPI, all other systems reviewed and are negative.          --------------------------------------------- PAST HISTORY ---------------------------------------------  Past Medical History:  has a past medical history of ADHD (attention deficit hyperactivity disorder), Admission for long-term (current) use of medications, Birth defect, Depression, Diabetes mellitus ([facility] [address] Environmental allergies, Eye pain, Headache, Hyperlipidemia, Hypertension, Latex allergy, Osteoarthritis, Screening for colon cancer, and Tricuspid regurgitation. Past Surgical History:  has a past surgical history that includes Hysterectomy (1984 - [facility]); Toe Surgery (1994 - [facility] ); Knee cartilage surgery (1999 - Right Knee - [facility]); Carpal tunnel release (2000 - [facility]);  Toe Surgery (2011 - R Big Toe Nail Removal ); Vein Surgery (3/11/2011 - Capped Vein Right Leg - [facility]); AST 10 0 - 31 U/L   Troponin   Result Value Ref Range    Troponin <0.01 0.00 - 0.03 ng/mL   Lipase   Result Value Ref Range    Lipase 18 13 - 60 U/L   Lactate, Sepsis   Result Value Ref Range    Lactic Acid, Sepsis 2.0 (H) 0.5 - 1.9 mmol/L   Troponin   Result Value Ref Range    Troponin <0.01 0.00 - 0.03 ng/mL   Urinalysis   Result Value Ref Range    Color, UA Yellow Straw/Yellow    Clarity, UA Clear Clear    Glucose, Ur Negative Negative mg/dL    Bilirubin Urine SMALL (A) Negative    Ketones, Urine Negative Negative mg/dL    Specific Gravity, UA >=1.030 1.005 - 1.030    Blood, Urine Negative Negative    pH, UA 5.0 5.0 - 9.0    Protein, UA Negative Negative mg/dL    Urobilinogen, Urine 0.2 <2.0 E.U./dL    Nitrite, Urine Negative Negative    Leukocyte Esterase, Urine SMALL (A) Negative   Microscopic Urinalysis   Result Value Ref Range    WBC, UA 5-10 0 - 5 /HPF    RBC, UA 2-5 0 - 2 /HPF    Epi Cells MODERATE /HPF    Bacteria, UA FEW (A) /HPF   Basic Metabolic Panel   Result Value Ref Range    Sodium 135 132 - 146 mmol/L    Potassium 5.2 (H) 3.5 - 5.0 mmol/L    Chloride 106 98 - 107 mmol/L    CO2 17 (L) 22 - 29 mmol/L    Anion Gap 12 7 - 16 mmol/L    Glucose 182 (H) 74 - 99 mg/dL    BUN 30 (H) 8 - 23 mg/dL    CREATININE 1.6 (H) 0.5 - 1.0 mg/dL    GFR Non-African American 31 >=60 mL/min/1.73    GFR African American 38     Calcium 9.3 8.6 - 10.2 mg/dL   EKG 12 Lead   Result Value Ref Range    Ventricular Rate 73 BPM    Atrial Rate 73 BPM    P-R Interval 140 ms    QRS Duration 86 ms    Q-T Interval 392 ms    QTc Calculation (Bazett) 431 ms    P Axis 9 degrees    R Axis 26 degrees    T Axis 25 degrees   With    RADIOLOGY:  Interpreted by Radiologist.  CT ABDOMEN PELVIS WO CONTRAST   Final Result      1. Nonobstructing bilateral renal calculi. 2. Diverticulosis without diverticulitis. 3. Small hiatal hernia. XR CHEST STANDARD (2 VW)   Final Result      No airspace opacities or pleural effusion. Patient sent in for evaluation. Chemistry panel resulted she did have a slight bump in her creatinine likely related to dehydration will medicate her and provide her with IV fluids and obtain a repeat creatinine level, repeat is now down to 1.6. She does have a slight LILI second to dehydration but was provided IV fluids. Urinalysis showing positive urinary tract infection, small leukocytes with 5-10 WBCs will start patient on antibiotics. White blood cell count slightly elevated at 12.6 otherwise unremarkable most likely related to her vomiting and diarrhea response as well as urinary tract infection. Troponin is negative, lactic acid level essentially negative and lipase negative. CT abdomen pelvis unremarkable no acute abdominal process. Twelve-lead EKG interpreted showed a heart rate of 73, normal sinus rhythm. No acute ST elevation or depression noted. Normal axis deviation. Chest x-ray negative. Patient is completely nontoxic here. She did have a slight LILI but this is all related to dehydration from having vomiting and diarrhea as she has been free from vomiting and diarrhea for over 24 hours she does live in assisted living facility and does report that she may have been exposed there but she was unaware of anything going around. She also does have a urinary tract infection. For us no nausea no vomiting no diarrhea she was provided with IV fluids she ate an entire meal tray easily without any vomiting or diarrhea. Plan will be for discharge home she was educated on laboratory values and for repeat testing. Patient does not have any chest pain, shortness of breath and no further abdominal pain. And actually on exam there was very very slight point tenderness but essentially unremarkable abdomen is soft it is nonsurgical it is unremarkable overall. Patient will be discharged with Keflex and Zofran.   She did express understanding of good follow-up care as well as when to return back to the

## 2020-01-22 ENCOUNTER — HOSPITAL ENCOUNTER (OUTPATIENT)
Dept: CT IMAGING | Age: 80
Discharge: HOME | End: 2020-01-24
Payer: MEDICARE

## 2020-01-22 PROCEDURE — 70450 CT HEAD/BRAIN W/O DYE: CPT

## 2020-01-31 ENCOUNTER — HOSPITAL ENCOUNTER (OUTPATIENT)
Dept: ULTRASOUND IMAGING | Age: 80
Discharge: HOME | End: 2020-02-02
Payer: MEDICARE

## 2020-01-31 PROCEDURE — 76770 US EXAM ABDO BACK WALL COMP: CPT

## 2020-05-06 ENCOUNTER — HOSPITAL ENCOUNTER (OUTPATIENT)
Dept: GENERAL RADIOLOGY | Age: 80
Discharge: HOME | End: 2020-05-08
Payer: MEDICARE

## 2020-05-06 ENCOUNTER — HOSPITAL ENCOUNTER (OUTPATIENT)
Age: 80
Discharge: HOME | End: 2020-05-08
Payer: MEDICARE

## 2020-05-06 PROCEDURE — 72040 X-RAY EXAM NECK SPINE 2-3 VW: CPT

## 2020-05-06 PROCEDURE — 72100 X-RAY EXAM L-S SPINE 2/3 VWS: CPT

## 2020-05-06 PROCEDURE — 72072 X-RAY EXAM THORAC SPINE 3VWS: CPT

## 2020-06-08 ENCOUNTER — HOSPITAL ENCOUNTER (OUTPATIENT)
Age: 80
Discharge: HOME | End: 2020-06-10
Payer: MEDICARE

## 2020-06-08 ENCOUNTER — HOSPITAL ENCOUNTER (OUTPATIENT)
Dept: GENERAL RADIOLOGY | Age: 80
Discharge: HOME | End: 2020-06-10
Payer: MEDICARE

## 2020-06-08 PROCEDURE — 73502 X-RAY EXAM HIP UNI 2-3 VIEWS: CPT

## 2020-12-23 ENCOUNTER — HOSPITAL ENCOUNTER (OUTPATIENT)
Age: 80
Discharge: HOME | End: 2020-12-25
Payer: MEDICARE

## 2020-12-23 ENCOUNTER — HOSPITAL ENCOUNTER (OUTPATIENT)
Dept: GENERAL RADIOLOGY | Age: 80
Discharge: HOME | End: 2020-12-25
Payer: MEDICARE

## 2020-12-23 PROCEDURE — 73502 X-RAY EXAM HIP UNI 2-3 VIEWS: CPT

## 2020-12-23 PROCEDURE — 72110 X-RAY EXAM L-2 SPINE 4/>VWS: CPT

## 2020-12-23 PROCEDURE — 72202 X-RAY EXAM SI JOINTS 3/> VWS: CPT

## 2021-01-04 ENCOUNTER — HOSPITAL ENCOUNTER (OUTPATIENT)
Dept: ULTRASOUND IMAGING | Age: 81
Discharge: HOME | End: 2021-01-06
Payer: MEDICARE

## 2021-01-04 ENCOUNTER — HOSPITAL ENCOUNTER (OUTPATIENT)
Dept: CT IMAGING | Age: 81
Discharge: HOME | End: 2021-01-06
Payer: MEDICARE

## 2021-01-04 ENCOUNTER — HOSPITAL ENCOUNTER (OUTPATIENT)
Age: 81
Discharge: HOME | End: 2021-01-06
Payer: MEDICARE

## 2021-01-04 DIAGNOSIS — M48.46XA: ICD-10-CM

## 2021-01-04 DIAGNOSIS — M53.3 DISORDER OF SACRUM: ICD-10-CM

## 2021-01-04 DIAGNOSIS — Z86.73 PERSONAL HISTORY OF TRANSIENT CEREBRAL ISCHEMIA: ICD-10-CM

## 2021-01-04 DIAGNOSIS — M54.16 LUMBAR RADICULOPATHY: ICD-10-CM

## 2021-01-04 DIAGNOSIS — M43.06: ICD-10-CM

## 2021-01-04 DIAGNOSIS — R09.89 SYMPTOMS INVOLVING CARDIOVASCULAR SYSTEM: ICD-10-CM

## 2021-01-04 DIAGNOSIS — I70.0 ATHEROSCLEROSIS OF AORTA (HCC): ICD-10-CM

## 2021-01-04 PROCEDURE — 6360000004 HC RX CONTRAST MEDICATION: Performed by: RADIOLOGY

## 2021-01-04 PROCEDURE — 71260 CT THORAX DX C+: CPT

## 2021-01-04 PROCEDURE — 72192 CT PELVIS W/O DYE: CPT

## 2021-01-04 PROCEDURE — 93880 EXTRACRANIAL BILAT STUDY: CPT

## 2021-01-04 PROCEDURE — 2580000003 HC RX 258: Performed by: RADIOLOGY

## 2021-01-04 PROCEDURE — 72131 CT LUMBAR SPINE W/O DYE: CPT

## 2021-01-04 RX ORDER — SODIUM CHLORIDE 0.9 % (FLUSH) 0.9 %
10 SYRINGE (ML) INJECTION 2 TIMES DAILY
Status: DISCONTINUED | OUTPATIENT
Start: 2021-01-04 | End: 2021-01-07 | Stop reason: HOSPADM

## 2021-01-04 RX ADMIN — IOPAMIDOL 100 ML: 755 INJECTION, SOLUTION INTRAVENOUS at 09:20

## 2021-01-04 RX ADMIN — Medication 10 ML: at 09:20

## 2021-01-12 ENCOUNTER — APPOINTMENT (OUTPATIENT)
Dept: GENERAL RADIOLOGY | Age: 81
End: 2021-01-12
Payer: MEDICARE

## 2021-01-12 ENCOUNTER — HOSPITAL ENCOUNTER (EMERGENCY)
Age: 81
Discharge: HOME | End: 2021-01-12
Attending: EMERGENCY MEDICINE
Payer: MEDICARE

## 2021-01-12 VITALS
TEMPERATURE: 97.8 F | RESPIRATION RATE: 16 BRPM | HEIGHT: 60 IN | WEIGHT: 200 LBS | OXYGEN SATURATION: 100 % | DIASTOLIC BLOOD PRESSURE: 52 MMHG | SYSTOLIC BLOOD PRESSURE: 130 MMHG | HEART RATE: 49 BPM | BODY MASS INDEX: 39.27 KG/M2

## 2021-01-12 DIAGNOSIS — M25.551 RIGHT HIP PAIN: Primary | ICD-10-CM

## 2021-01-12 LAB
ALBUMIN SERPL-MCNC: 4.2 G/DL (ref 3.5–5.2)
ALP BLD-CCNC: 57 U/L (ref 35–104)
ALT SERPL-CCNC: 9 U/L (ref 0–32)
ANION GAP SERPL CALCULATED.3IONS-SCNC: 9 MMOL/L (ref 7–16)
AST SERPL-CCNC: 13 U/L (ref 0–31)
BACTERIA: ABNORMAL /HPF
BASOPHILS ABSOLUTE: 0.06 E9/L (ref 0–0.2)
BASOPHILS RELATIVE PERCENT: 0.7 % (ref 0–2)
BILIRUB SERPL-MCNC: 0.3 MG/DL (ref 0–1.2)
BILIRUBIN URINE: NEGATIVE
BLOOD, URINE: NEGATIVE
BUN BLDV-MCNC: 28 MG/DL (ref 8–23)
CALCIUM SERPL-MCNC: 9.6 MG/DL (ref 8.6–10.2)
CHLORIDE BLD-SCNC: 108 MMOL/L (ref 98–107)
CLARITY: CLEAR
CO2: 21 MMOL/L (ref 22–29)
COLOR: YELLOW
CREAT SERPL-MCNC: 1.3 MG/DL (ref 0.5–1)
EOSINOPHILS ABSOLUTE: 0.33 E9/L (ref 0.05–0.5)
EOSINOPHILS RELATIVE PERCENT: 3.9 % (ref 0–6)
GFR AFRICAN AMERICAN: 48
GFR NON-AFRICAN AMERICAN: 39 ML/MIN/1.73
GLUCOSE BLD-MCNC: 132 MG/DL (ref 74–99)
GLUCOSE URINE: NEGATIVE MG/DL
HCT VFR BLD CALC: 32.7 % (ref 34–48)
HEMOGLOBIN: 10.9 G/DL (ref 11.5–15.5)
IMMATURE GRANULOCYTES #: 0.04 E9/L
IMMATURE GRANULOCYTES %: 0.5 % (ref 0–5)
KETONES, URINE: NEGATIVE MG/DL
LEUKOCYTE ESTERASE, URINE: ABNORMAL
LYMPHOCYTES ABSOLUTE: 1.91 E9/L (ref 1.5–4)
LYMPHOCYTES RELATIVE PERCENT: 22.7 % (ref 20–42)
MCH RBC QN AUTO: 31.4 PG (ref 26–35)
MCHC RBC AUTO-ENTMCNC: 33.3 % (ref 32–34.5)
MCV RBC AUTO: 94.2 FL (ref 80–99.9)
MONOCYTES ABSOLUTE: 0.63 E9/L (ref 0.1–0.95)
MONOCYTES RELATIVE PERCENT: 7.5 % (ref 2–12)
NEUTROPHILS ABSOLUTE: 5.46 E9/L (ref 1.8–7.3)
NEUTROPHILS RELATIVE PERCENT: 64.7 % (ref 43–80)
NITRITE, URINE: NEGATIVE
PDW BLD-RTO: 13.3 FL (ref 11.5–15)
PH UA: 5 (ref 5–9)
PLATELET # BLD: 256 E9/L (ref 130–450)
PMV BLD AUTO: 9.9 FL (ref 7–12)
POTASSIUM REFLEX MAGNESIUM: 5 MMOL/L (ref 3.5–5)
PROTEIN UA: NEGATIVE MG/DL
RBC # BLD: 3.47 E12/L (ref 3.5–5.5)
RBC UA: ABNORMAL /HPF (ref 0–2)
REASON FOR REJECTION: NORMAL
REJECTED TEST: NORMAL
SODIUM BLD-SCNC: 138 MMOL/L (ref 132–146)
SPECIFIC GRAVITY UA: 1.01 (ref 1–1.03)
TOTAL PROTEIN: 7.5 G/DL (ref 6.4–8.3)
UROBILINOGEN, URINE: 0.2 E.U./DL
WBC # BLD: 8.4 E9/L (ref 4.5–11.5)
WBC UA: ABNORMAL /HPF (ref 0–5)

## 2021-01-12 PROCEDURE — 36415 COLL VENOUS BLD VENIPUNCTURE: CPT

## 2021-01-12 PROCEDURE — 73502 X-RAY EXAM HIP UNI 2-3 VIEWS: CPT

## 2021-01-12 PROCEDURE — 6360000002 HC RX W HCPCS: Performed by: GENERAL PRACTICE

## 2021-01-12 PROCEDURE — 85025 COMPLETE CBC W/AUTO DIFF WBC: CPT

## 2021-01-12 PROCEDURE — 80053 COMPREHEN METABOLIC PANEL: CPT

## 2021-01-12 PROCEDURE — 2580000003 HC RX 258: Performed by: GENERAL PRACTICE

## 2021-01-12 PROCEDURE — 96365 THER/PROPH/DIAG IV INF INIT: CPT

## 2021-01-12 PROCEDURE — 87088 URINE BACTERIA CULTURE: CPT

## 2021-01-12 PROCEDURE — 99285 EMERGENCY DEPT VISIT HI MDM: CPT

## 2021-01-12 PROCEDURE — 96375 TX/PRO/DX INJ NEW DRUG ADDON: CPT

## 2021-01-12 PROCEDURE — 81001 URINALYSIS AUTO W/SCOPE: CPT

## 2021-01-12 RX ORDER — KETOROLAC TROMETHAMINE 30 MG/ML
15 INJECTION, SOLUTION INTRAMUSCULAR; INTRAVENOUS ONCE
Status: COMPLETED | OUTPATIENT
Start: 2021-01-12 | End: 2021-01-12

## 2021-01-12 RX ORDER — FENTANYL CITRATE 50 UG/ML
25 INJECTION, SOLUTION INTRAMUSCULAR; INTRAVENOUS ONCE
Status: COMPLETED | OUTPATIENT
Start: 2021-01-12 | End: 2021-01-12

## 2021-01-12 RX ORDER — KETOROLAC TROMETHAMINE 10 MG/1
10 TABLET, FILM COATED ORAL EVERY 6 HOURS PRN
Qty: 20 TABLET | Refills: 0 | Status: SHIPPED | OUTPATIENT
Start: 2021-01-12

## 2021-01-12 RX ORDER — METHOCARBAMOL 500 MG/1
500 TABLET, FILM COATED ORAL 4 TIMES DAILY
Qty: 40 TABLET | Refills: 0 | Status: SHIPPED | OUTPATIENT
Start: 2021-01-12 | End: 2021-01-22

## 2021-01-12 RX ADMIN — FENTANYL CITRATE 25 MCG: 50 INJECTION, SOLUTION INTRAMUSCULAR; INTRAVENOUS at 09:33

## 2021-01-12 RX ADMIN — METHOCARBAMOL 500 MG: 100 INJECTION, SOLUTION INTRAMUSCULAR; INTRAVENOUS at 09:34

## 2021-01-12 RX ADMIN — KETOROLAC TROMETHAMINE 15 MG: 30 INJECTION, SOLUTION INTRAMUSCULAR at 09:33

## 2021-01-12 ASSESSMENT — ENCOUNTER SYMPTOMS
ABDOMINAL PAIN: 0
SHORTNESS OF BREATH: 0
EYE PAIN: 0
COUGH: 0
SINUS PAIN: 0
CHOKING: 0
DIARRHEA: 0
CHEST TIGHTNESS: 0
WHEEZING: 0
NAUSEA: 0
VOMITING: 0
SORE THROAT: 0
BACK PAIN: 1

## 2021-01-12 ASSESSMENT — PAIN SCALES - GENERAL: PAINLEVEL_OUTOF10: 8

## 2021-01-12 NOTE — ED PROVIDER NOTES
Psychiatric/Behavioral: Negative for confusion. Physical Exam  Vitals signs and nursing note reviewed. Constitutional:       Appearance: She is well-developed. HENT:      Head: Normocephalic and atraumatic. Eyes:      Pupils: Pupils are equal, round, and reactive to light. Neck:      Musculoskeletal: Normal range of motion and neck supple. Cardiovascular:      Rate and Rhythm: Normal rate and regular rhythm. Pulses: Normal pulses. Pulmonary:      Effort: Pulmonary effort is normal. No respiratory distress. Breath sounds: Normal breath sounds. No wheezing or rales. Abdominal:      General: Bowel sounds are normal.      Palpations: Abdomen is soft. Tenderness: There is no abdominal tenderness. There is no guarding or rebound. Musculoskeletal:      Comments: Exquisite tenderness to palpation of right lumbar paraspinal musculature appreciated. No midline tenderness or tenderness to the bony prominences of the right hip. Skin:     General: Skin is warm and dry. Neurological:      Mental Status: She is alert and oriented to person, place, and time. Cranial Nerves: No cranial nerve deficit. Coordination: Coordination normal.          Procedures     MDM  Number of Diagnoses or Management Options  Right hip pain  Diagnosis management comments: Emergency Department evaluation was notable for stable vital signs, unremarkable laboratory studies, stable imaging studies, significant improvement of patient's symptoms with supportive therapies, and ambulation without significant difficulty. Patient was considered stable for discharge to home with outpatient follow-up. Will be provided prescriptions for further supportive therapy as needed.     They were advised to return to the emergency department should they develop fever, chills, night sweats, nausea, vomiting, diarrhea, chest pain, shortness of breath, or worsening of their symptoms despite treatment from this emergency department visit. They were instructed to follow-up with their primary care provider in 2 days. This information was relayed to the patient who understood this plan of care and was amenable to the plan.            --------------------------------------------- PAST HISTORY ---------------------------------------------  Past Medical History:  has a past medical history of ADHD (attention deficit hyperactivity disorder), Admission for long-term (current) use of medications, Birth defect, Depression, Diabetes mellitus ([facility] [address] Environmental allergies, Eye pain, Headache, Hyperlipidemia, Hypertension, Latex allergy, Osteoarthritis, Screening for colon cancer, and Tricuspid regurgitation. Past Surgical History:  has a past surgical history that includes Hysterectomy (1984 - [facility]); Toe Surgery (1994 - L-Big Toe Nail Rmoval ); Knee cartilage surgery (1999 - Right Knee - [facility]); Carpal tunnel release (2000 - Right [facility]); Toe Surgery (2011 - R Big Toe Nail Removal ); Vein Surgery (3/11/2011 - Capped Vein Right Leg - [facility]); Colonoscopy (7/29/2013); joint replacement (2009 - Left Hip [facility] [place]); joint replacement (2012  left knee); joint replacement (05/08/2018); and pr total knee arthroplasty (Right, 5/8/2018). Social History:  reports that she quit smoking about 25 years ago. She has a 25.00 pack-year smoking history. She has never used smokeless tobacco. She reports that she does not drink alcohol or use drugs. Family History: family history includes Alcohol Abuse in her sister; Diabetes in her mother; Heart Disease in her brother, father, maternal grandfather, and mother; Obesity in her brother; Parkinsonism in her mother. The patients home medications have been reviewed.     Allergies: Latex and Eye drops [carboxymethylcellulose sodium]    -------------------------------------------------- RESULTS -------------------------------------------------  Labs:  Results for orders placed or performed during the hospital encounter of 01/12/21   Comprehensive Metabolic Panel w/ Reflex to MG   Result Value Ref Range    Sodium 138 132 - 146 mmol/L    Potassium reflex Magnesium 5.0 3.5 - 5.0 mmol/L    Chloride 108 (H) 98 - 107 mmol/L    CO2 21 (L) 22 - 29 mmol/L    Anion Gap 9 7 - 16 mmol/L    Glucose 132 (H) 74 - 99 mg/dL    BUN 28 (H) 8 - 23 mg/dL    CREATININE 1.3 (H) 0.5 - 1.0 mg/dL    GFR Non-African American 39 >=60 mL/min/1.73    GFR African American 48     Calcium 9.6 8.6 - 10.2 mg/dL    Total Protein 7.5 6.4 - 8.3 g/dL    Alb 4.2 3.5 - 5.2 g/dL    Total Bilirubin 0.3 0.0 - 1.2 mg/dL    Alkaline Phosphatase 57 35 - 104 U/L    ALT 9 0 - 32 U/L    AST 13 0 - 31 U/L   Urinalysis, reflex to microscopic   Result Value Ref Range    Color, UA Yellow Straw/Yellow    Clarity, UA Clear Clear    Glucose, Ur Negative Negative mg/dL    Bilirubin Urine Negative Negative    Ketones, Urine Negative Negative mg/dL    Specific Gravity, UA 1.015 1.005 - 1.030    Blood, Urine Negative Negative    pH, UA 5.0 5.0 - 9.0    Protein, UA Negative Negative mg/dL    Urobilinogen, Urine 0.2 <2.0 E.U./dL    Nitrite, Urine Negative Negative    Leukocyte Esterase, Urine TRACE (A) Negative   Microscopic Urinalysis   Result Value Ref Range    WBC, UA 0-1 0 - 5 /HPF    RBC, UA NONE 0 - 2 /HPF    Bacteria, UA RARE (A) None Seen /HPF   SPECIMEN REJECTION   Result Value Ref Range    Rejected Test cbcwd     Reason for Rejection see below    CBC auto differential   Result Value Ref Range    WBC 8.4 4.5 - 11.5 E9/L    RBC 3.47 (L) 3.50 - 5.50 E12/L    Hemoglobin 10.9 (L) 11.5 - 15.5 g/dL    Hematocrit 32.7 (L) 34.0 - 48.0 %    MCV 94.2 80.0 - 99.9 fL    MCH 31.4 26.0 - 35.0 pg    MCHC 33.3 32.0 - 34.5 %    RDW 13.3 11.5 - 15.0 fL    Platelets 738 448 - 760 E9/L    MPV 9.9 7.0 - 12.0 fL    Neutrophils % 64.7 43.0 - 80.0 %    Immature Granulocytes % 0.5 0.0 - 5.0 %    Lymphocytes % 22.7 20.0 - 42.0 %    Monocytes % 7.5 2.0 - 12.0 %    Eosinophils % 3.9 0.0 - 6.0 %    Basophils % 0.7 0.0 - 2.0 %    Neutrophils Absolute 5.46 1.80 - 7.30 E9/L    Immature Granulocytes # 0.04 E9/L    Lymphocytes Absolute 1.91 1.50 - 4.00 E9/L    Monocytes Absolute 0.63 0.10 - 0.95 E9/L    Eosinophils Absolute 0.33 0.05 - 0.50 E9/L    Basophils Absolute 0.06 0.00 - 0.20 E9/L       Radiology:  XR HIP 2-3 VW W PELVIS RIGHT   Final Result   No acute osseous abnormality.          ------------------------- NURSING NOTES AND VITALS REVIEWED ---------------------------  Date / Time Roomed:  1/12/2021  6:24 AM  ED Bed Assignment:  [facility] [id number]    The nursing notes within the ED encounter and vital signs as below have been reviewed. BP (!) 130/52   Pulse (!) 49   Temp 97.8 °F (36.6 °C) (Oral)   Resp 16   Ht 5' (1.524 m)   Wt 200 lb (90.7 kg)   SpO2 100%   BMI 39.06 kg/m²   Oxygen Saturation Interpretation: Normal      ------------------------------------------ PROGRESS NOTES ------------------------------------------  7:12 AM EST  I have spoken with the patient and discussed todays results, in addition to providing specific details for the plan of care and counseling regarding the diagnosis and prognosis. Their questions are answered at this time and they are agreeable with the plan. I discussed at length with them reasons for immediate return here for re evaluation. They will followup with their primary care physician by calling their office tomorrow. --------------------------------- ADDITIONAL PROVIDER NOTES ---------------------------------  At this time the patient is without objective evidence of an acute process requiring hospitalization or inpatient management. They have remained hemodynamically stable throughout their entire ED visit and are stable for discharge with outpatient follow-up. The plan has been discussed in detail and they are aware of the specific conditions for emergent return, as well as the importance of follow-up.       Discharge Medication List as of 1/12/2021 12:48 PM      START taking these medications    Details   methocarbamol (ROBAXIN) 500 MG tablet Take 1 tablet by mouth 4 times daily for 10 days, Disp-40 tablet, R-0Print      ketorolac (TORADOL) 10 MG tablet Take 1 tablet by mouth every 6 hours as needed for Pain, Disp-20 tablet, R-0Print             Diagnosis:  1. Right hip pain        Disposition:  Patient's disposition: Discharge to home  Patient's condition is stable.                  [address] DO  Resident  01/12/21 8564

## 2021-01-14 LAB — URINE CULTURE, ROUTINE: NORMAL

## 2021-01-23 ENCOUNTER — APPOINTMENT (OUTPATIENT)
Dept: CT IMAGING | Age: 81
End: 2021-01-23
Payer: MEDICARE

## 2021-01-23 ENCOUNTER — HOSPITAL ENCOUNTER (EMERGENCY)
Age: 81
Discharge: HOME | End: 2021-01-23
Attending: EMERGENCY MEDICINE
Payer: MEDICARE

## 2021-01-23 ENCOUNTER — APPOINTMENT (OUTPATIENT)
Dept: GENERAL RADIOLOGY | Age: 81
End: 2021-01-23
Payer: MEDICARE

## 2021-01-23 VITALS
WEIGHT: 200 LBS | RESPIRATION RATE: 18 BRPM | HEART RATE: 61 BPM | DIASTOLIC BLOOD PRESSURE: 68 MMHG | TEMPERATURE: 97.9 F | SYSTOLIC BLOOD PRESSURE: 138 MMHG | HEIGHT: 60 IN | BODY MASS INDEX: 39.27 KG/M2 | OXYGEN SATURATION: 99 %

## 2021-01-23 DIAGNOSIS — R11.2 NON-INTRACTABLE VOMITING WITH NAUSEA, UNSPECIFIED VOMITING TYPE: Primary | ICD-10-CM

## 2021-01-23 LAB
ALBUMIN SERPL-MCNC: 4 G/DL (ref 3.5–5.2)
ALP BLD-CCNC: 52 U/L (ref 35–104)
ALT SERPL-CCNC: 7 U/L (ref 0–32)
ANION GAP SERPL CALCULATED.3IONS-SCNC: 10 MMOL/L (ref 7–16)
AST SERPL-CCNC: 9 U/L (ref 0–31)
BACTERIA: ABNORMAL /HPF
BASOPHILS ABSOLUTE: 0.03 E9/L (ref 0–0.2)
BASOPHILS RELATIVE PERCENT: 0.3 % (ref 0–2)
BILIRUB SERPL-MCNC: 0.4 MG/DL (ref 0–1.2)
BILIRUBIN URINE: NEGATIVE
BLOOD, URINE: ABNORMAL
BUN BLDV-MCNC: 24 MG/DL (ref 8–23)
CALCIUM SERPL-MCNC: 9.5 MG/DL (ref 8.6–10.2)
CHLORIDE BLD-SCNC: 102 MMOL/L (ref 98–107)
CLARITY: CLEAR
CO2: 22 MMOL/L (ref 22–29)
COLOR: YELLOW
CREAT SERPL-MCNC: 1.4 MG/DL (ref 0.5–1)
EKG ATRIAL RATE: 50 BPM
EKG P AXIS: 16 DEGREES
EKG P-R INTERVAL: 160 MS
EKG Q-T INTERVAL: 462 MS
EKG QRS DURATION: 88 MS
EKG QTC CALCULATION (BAZETT): 421 MS
EKG R AXIS: 31 DEGREES
EKG T AXIS: 48 DEGREES
EKG VENTRICULAR RATE: 50 BPM
EOSINOPHILS ABSOLUTE: 0.03 E9/L (ref 0.05–0.5)
EOSINOPHILS RELATIVE PERCENT: 0.3 % (ref 0–6)
EPITHELIAL CELLS, UA: ABNORMAL /HPF
GFR AFRICAN AMERICAN: 44
GFR NON-AFRICAN AMERICAN: 36 ML/MIN/1.73
GLUCOSE BLD-MCNC: 184 MG/DL (ref 74–99)
GLUCOSE URINE: NEGATIVE MG/DL
HCT VFR BLD CALC: 29.4 % (ref 34–48)
HEMOGLOBIN: 9.8 G/DL (ref 11.5–15.5)
IMMATURE GRANULOCYTES #: 0.07 E9/L
IMMATURE GRANULOCYTES %: 0.7 % (ref 0–5)
KETONES, URINE: ABNORMAL MG/DL
LACTIC ACID: 2.4 MMOL/L (ref 0.5–2.2)
LEUKOCYTE ESTERASE, URINE: NEGATIVE
LIPASE: 15 U/L (ref 13–60)
LYMPHOCYTES ABSOLUTE: 1.47 E9/L (ref 1.5–4)
LYMPHOCYTES RELATIVE PERCENT: 14.3 % (ref 20–42)
MCH RBC QN AUTO: 31.6 PG (ref 26–35)
MCHC RBC AUTO-ENTMCNC: 33.3 % (ref 32–34.5)
MCV RBC AUTO: 94.8 FL (ref 80–99.9)
MONOCYTES ABSOLUTE: 0.71 E9/L (ref 0.1–0.95)
MONOCYTES RELATIVE PERCENT: 6.9 % (ref 2–12)
NEUTROPHILS ABSOLUTE: 7.96 E9/L (ref 1.8–7.3)
NEUTROPHILS RELATIVE PERCENT: 77.5 % (ref 43–80)
NITRITE, URINE: NEGATIVE
PDW BLD-RTO: 13.5 FL (ref 11.5–15)
PH UA: 5.5 (ref 5–9)
PLATELET # BLD: 239 E9/L (ref 130–450)
PMV BLD AUTO: 9.3 FL (ref 7–12)
POTASSIUM SERPL-SCNC: 4.9 MMOL/L (ref 3.5–5)
PROTEIN UA: NEGATIVE MG/DL
RBC # BLD: 3.1 E12/L (ref 3.5–5.5)
RBC UA: ABNORMAL /HPF (ref 0–2)
SARS-COV-2, NAAT: NOT DETECTED
SODIUM BLD-SCNC: 134 MMOL/L (ref 132–146)
SPECIFIC GRAVITY UA: 1.02 (ref 1–1.03)
TOTAL PROTEIN: 7.3 G/DL (ref 6.4–8.3)
TROPONIN: <0.01 NG/ML (ref 0–0.03)
UROBILINOGEN, URINE: 0.2 E.U./DL
WBC # BLD: 10.3 E9/L (ref 4.5–11.5)
WBC UA: ABNORMAL /HPF (ref 0–5)

## 2021-01-23 PROCEDURE — 96374 THER/PROPH/DIAG INJ IV PUSH: CPT

## 2021-01-23 PROCEDURE — 96376 TX/PRO/DX INJ SAME DRUG ADON: CPT

## 2021-01-23 PROCEDURE — 93010 ELECTROCARDIOGRAM REPORT: CPT | Performed by: INTERNAL MEDICINE

## 2021-01-23 PROCEDURE — 2580000003 HC RX 258: Performed by: EMERGENCY MEDICINE

## 2021-01-23 PROCEDURE — 83690 ASSAY OF LIPASE: CPT

## 2021-01-23 PROCEDURE — 71045 X-RAY EXAM CHEST 1 VIEW: CPT

## 2021-01-23 PROCEDURE — 84484 ASSAY OF TROPONIN QUANT: CPT

## 2021-01-23 PROCEDURE — U0002 COVID-19 LAB TEST NON-CDC: HCPCS

## 2021-01-23 PROCEDURE — 85025 COMPLETE CBC W/AUTO DIFF WBC: CPT

## 2021-01-23 PROCEDURE — 96375 TX/PRO/DX INJ NEW DRUG ADDON: CPT

## 2021-01-23 PROCEDURE — 81001 URINALYSIS AUTO W/SCOPE: CPT

## 2021-01-23 PROCEDURE — 6360000002 HC RX W HCPCS: Performed by: EMERGENCY MEDICINE

## 2021-01-23 PROCEDURE — 93005 ELECTROCARDIOGRAM TRACING: CPT | Performed by: EMERGENCY MEDICINE

## 2021-01-23 PROCEDURE — 99285 EMERGENCY DEPT VISIT HI MDM: CPT

## 2021-01-23 PROCEDURE — C9113 INJ PANTOPRAZOLE SODIUM, VIA: HCPCS | Performed by: EMERGENCY MEDICINE

## 2021-01-23 PROCEDURE — 80053 COMPREHEN METABOLIC PANEL: CPT

## 2021-01-23 PROCEDURE — 74176 CT ABD & PELVIS W/O CONTRAST: CPT

## 2021-01-23 PROCEDURE — 83605 ASSAY OF LACTIC ACID: CPT

## 2021-01-23 RX ORDER — LIDOCAINE HYDROCHLORIDE 10 MG/ML
20 INJECTION, SOLUTION INFILTRATION; PERINEURAL ONCE
Status: DISCONTINUED | OUTPATIENT
Start: 2021-01-23 | End: 2021-01-23

## 2021-01-23 RX ORDER — ONDANSETRON 2 MG/ML
4 INJECTION INTRAMUSCULAR; INTRAVENOUS ONCE
Status: COMPLETED | OUTPATIENT
Start: 2021-01-23 | End: 2021-01-23

## 2021-01-23 RX ORDER — FENTANYL CITRATE 50 UG/ML
50 INJECTION, SOLUTION INTRAMUSCULAR; INTRAVENOUS ONCE
Status: COMPLETED | OUTPATIENT
Start: 2021-01-23 | End: 2021-01-23

## 2021-01-23 RX ORDER — DICYCLOMINE HYDROCHLORIDE 10 MG/1
20 CAPSULE ORAL 4 TIMES DAILY PRN
Qty: 60 CAPSULE | Refills: 0 | Status: SHIPPED | OUTPATIENT
Start: 2021-01-23

## 2021-01-23 RX ORDER — FAMOTIDINE 20 MG/1
20 TABLET, FILM COATED ORAL 2 TIMES DAILY PRN
Qty: 60 TABLET | Refills: 0 | Status: SHIPPED | OUTPATIENT
Start: 2021-01-23

## 2021-01-23 RX ORDER — ONDANSETRON 4 MG/1
4 TABLET, ORALLY DISINTEGRATING ORAL EVERY 8 HOURS PRN
Qty: 20 TABLET | Refills: 0 | Status: SHIPPED | OUTPATIENT
Start: 2021-01-23 | End: 2022-01-23

## 2021-01-23 RX ORDER — 0.9 % SODIUM CHLORIDE 0.9 %
500 INTRAVENOUS SOLUTION INTRAVENOUS ONCE
Status: COMPLETED | OUTPATIENT
Start: 2021-01-23 | End: 2021-01-23

## 2021-01-23 RX ORDER — PANTOPRAZOLE SODIUM 40 MG/10ML
40 INJECTION, POWDER, LYOPHILIZED, FOR SOLUTION INTRAVENOUS ONCE
Status: COMPLETED | OUTPATIENT
Start: 2021-01-23 | End: 2021-01-23

## 2021-01-23 RX ADMIN — FENTANYL CITRATE 50 MCG: 50 INJECTION, SOLUTION INTRAMUSCULAR; INTRAVENOUS at 16:05

## 2021-01-23 RX ADMIN — ONDANSETRON 4 MG: 2 INJECTION INTRAMUSCULAR; INTRAVENOUS at 16:01

## 2021-01-23 RX ADMIN — ONDANSETRON 4 MG: 2 INJECTION INTRAMUSCULAR; INTRAVENOUS at 18:02

## 2021-01-23 RX ADMIN — PANTOPRAZOLE SODIUM 40 MG: 40 INJECTION, POWDER, FOR SOLUTION INTRAVENOUS at 20:16

## 2021-01-23 RX ADMIN — FENTANYL CITRATE 50 MCG: 50 INJECTION, SOLUTION INTRAMUSCULAR; INTRAVENOUS at 20:16

## 2021-01-23 RX ADMIN — SODIUM CHLORIDE 500 ML: 9 INJECTION, SOLUTION INTRAVENOUS at 16:01

## 2021-01-23 ASSESSMENT — PAIN DESCRIPTION - LOCATION: LOCATION: ABDOMEN

## 2021-01-23 ASSESSMENT — PAIN SCALES - GENERAL: PAINLEVEL_OUTOF10: 7

## 2021-01-23 ASSESSMENT — PAIN DESCRIPTION - ORIENTATION: ORIENTATION: MID

## 2021-01-23 NOTE — ED PROVIDER NOTES
[facility]  Department of Emergency Medicine   ED  Encounter Note  Admit Date/RoomTime: 2021  2:56 PM  ED Room: ANEESH/ANEESH    NAME: Urbano Cintron  : 1940  MRN: [id number]     Chief Complaint:  Abdominal Pain, Emesis, and Nausea    History of Present Illness        Urbano Cintron is a 79 y.o. old female who presents to the emergency department for evaluation of abdominal pain, nausea and vomiting. She states that she thinks she may have the stomach flu and is also concerned for COVID-19. Symptoms began yesterday and she has not eaten anything since 12:00 noon yesterday. She has had no rectal bleeding. She denies diarrhea, constipation or urinary symptoms. She denies chest pain or shortness of breath. Surgical history includes a hysterectomy as well as colonoscopy in . She does have history of intracranial meningioma, she denies any headaches, blurred vision or neurologic symptoms. .  ROS   Pertinent positives and negatives are stated within HPI, all other systems reviewed and are negative. Past Medical History:  has a past medical history of ADHD (attention deficit hyperactivity disorder), Admission for long-term (current) use of medications, Birth defect, Depression, Diabetes mellitus ([address] Environmental allergies, Eye pain, Headache, Hyperlipidemia, Hypertension, Latex allergy, Osteoarthritis, Screening for colon cancer, and Tricuspid regurgitation. Surgical History has a past surgical history that includes Hysterectomy ( - [facility]); Toe Surgery ( - L-Big Toe Nail Rmoval ); Knee cartilage surgery ( - Right Knee - [facility]); Carpal tunnel release ( - Right Hand [facility]); Toe Surgery ( - R Big Toe Nail Removal ); Vein Surgery (3/11/2011 - Capped Vein Right Leg - [facility]);  Colonoscopy (2013); joint replacement ( - Left Hip [facility] [place]); joint replacement (  left knee); joint replacement (2018); and pr total knee arthroplasty (Right, 5/8/2018). Social History:  reports that she quit smoking about 25 years ago. She has a 25.00 pack-year smoking history. She has never used smokeless tobacco. She reports that she does not drink alcohol or use drugs. Family History: family history includes Alcohol Abuse in her sister; Diabetes in her mother; Heart Disease in her brother, father, maternal grandfather, and mother; Obesity in her brother; Parkinsonism in her mother. Allergies: Latex and Eye drops [carboxymethylcellulose sodium]    Physical Exam   Oxygen Saturation Interpretation: Normal.        ED Triage Vitals [01/23/21 1505]   BP Temp Temp Source Pulse Resp SpO2 Height Weight   (!) 170/78 97.9 °F (36.6 °C) Oral 56 18 100 % 5' (1.524 m) 200 lb (90.7 kg)         General Appearance/Constitutional:  Alert, development consistent with age  [de-identified]:  NC/NT. PERRLA. Airway patent. Neck:  Supple. No lymphadenopathy. Respiratory:  No retractions. Lungs Clear to auscultation and breath sounds equal.  CV:  Regular rate and rhythm. GI:  normal appearing, non-distended with no visible hernias. Bowel sounds: normal bowel sounds. Tenderness: There is moderate tenderness present - located in the epigastrium. , There is no rebound tenderness. , There is no guarding. , There is no distension. , There is no pulsatile mass. .           Liver: non-tender. Spleen:  non-tender. Back: CVA Tenderness: No.  Integument:  Normal turgor. Warm, dry, without visible rash, unless noted elsewhere. Lymphatics: No edema, cap.refill <3sec. Neurological:  Orientation age-appropriate. Motor functions intact.     Lab / Imaging Results   (All laboratory and radiology results have been personally reviewed by myself)  Labs:  Results for orders placed or performed during the hospital encounter of 01/23/21   CBC Auto Differential   Result Value Ref Range    WBC 10.3 4.5 - 11.5 E9/L    RBC 3.10 (L) 3.50 - 5.50 E12/L Nitrite, Urine Negative Negative    Leukocyte Esterase, Urine Negative Negative   COVID-19   Result Value Ref Range    SARS-CoV-2, NAAT Not Detected Not Detected   Microscopic Urinalysis   Result Value Ref Range    WBC, UA 0-1 0 - 5 /HPF    RBC, UA 0-1 0 - 2 /HPF    Epithelial Cells, UA FEW /HPF    Bacteria, UA FEW (A) None Seen /HPF   EKG 12 Lead   Result Value Ref Range    Ventricular Rate 50 BPM    Atrial Rate 50 BPM    P-R Interval 160 ms    QRS Duration 88 ms    Q-T Interval 462 ms    QTc Calculation (Bazett) 421 ms    P Axis 16 degrees    R Axis 31 degrees    T Axis 48 degrees     Imaging: All Radiology results interpreted by Radiologist unless otherwise noted. CT ABDOMEN PELVIS WO CONTRAST Additional Contrast? None   Final Result   Small bilateral nonobstructive renal stones measuring 3 mm or less in   diameter. No obstructive uropathy. Small hiatal hernia. Colonic diverticulosis with no evidence of diverticulitis. No other evidence of acute intra-abdominal pathology. XR CHEST PORTABLE   Final Result   Questionable infiltrates in the bilateral suprahilar regions. Evaluation   with PA and lateral chest radiographs is recommended. EKG: Interpreted by emergency department physician unless otherwise noted. Time:  15:47    Rate: 50 bpm  Rhythm: Sinus rhythm. Interpretation: Normal sinus rhythm  Comparison: There are no previous tracings available for comparison.     ED Course / Medical Decision Making     Medications   0.9 % sodium chloride bolus (0 mLs Intravenous Stopped 1/23/21 1912)   ondansetron (ZOFRAN) injection 4 mg (4 mg Intravenous Given 1/23/21 1601)   fentaNYL (SUBLIMAZE) injection 50 mcg (50 mcg Intravenous Given 1/23/21 1605)   ondansetron (ZOFRAN) injection 4 mg (4 mg Intravenous Given 1/23/21 1802)   pantoprazole (PROTONIX) injection 40 mg (40 mg Intravenous disintegrating tablet Place 1 tablet under the tongue every 8 hours as needed for Nausea or Vomiting, Disp-20 tablet, R-0Print      dicyclomine (BENTYL) 10 MG capsule Take 2 capsules by mouth 4 times daily as needed (Abdominal pain), Disp-60 capsule, R-nonePrint      famotidine (PEPCID) 20 MG tablet Take 1 tablet by mouth 2 times daily as needed (Acid reflux), Disp-60 tablet, R-0Print       !! - Potential duplicate medications found. Please discuss with provider. Electronically signed by Chelle Clemons DO   DD: 1/23/21  **This report was transcribed using voice recognition software. Every effort was made to ensure accuracy; however, inadvertent computerized transcription errors may be present.   END OF PROVIDER NOTE        Chelle Clemons DO  01/24/21 8005

## 2021-01-24 NOTE — ED NOTES
Unable to reach son at this time, attempted to call multiple times, goes directly to voicemail. Left message for him to call us back.      Glen Carter RN  01/23/21 2112

## 2021-01-24 NOTE — ED PROVIDER NOTES
Addendum: Patient discharged after tolerating p.o. fluids and stomach feeling better      Genesis Porras MD  01/24/21 0402

## 2021-01-24 NOTE — ED NOTES
Pt states that after taking a drink of water, she experienced increased abd pain and nausea. Denies any emesis.      Venessa Wren RN  01/23/21 2018

## 2021-04-23 ENCOUNTER — HOSPITAL ENCOUNTER (OUTPATIENT)
Age: 81
Discharge: HOME | End: 2021-04-25
Payer: MEDICARE

## 2021-04-23 ENCOUNTER — HOSPITAL ENCOUNTER (OUTPATIENT)
Dept: CT IMAGING | Age: 81
Discharge: HOME | End: 2021-04-25
Payer: MEDICARE

## 2021-04-23 DIAGNOSIS — R42 DIZZINESS AND GIDDINESS: ICD-10-CM

## 2021-04-23 DIAGNOSIS — F03.90 SENILE DEMENTIA WITHOUT BEHAVIORAL DISTURBANCE (HCC): ICD-10-CM

## 2021-04-23 PROCEDURE — 70450 CT HEAD/BRAIN W/O DYE: CPT

## 2021-05-28 ENCOUNTER — HOSPITAL ENCOUNTER (OUTPATIENT)
Dept: DIABETES SERVICES | Age: 81
Setting detail: THERAPIES SERIES
Discharge: HOME | End: 2021-05-28
Payer: MEDICARE

## 2021-11-29 ENCOUNTER — APPOINTMENT (OUTPATIENT)
Dept: CT IMAGING | Age: 81
End: 2021-11-29
Payer: MEDICARE

## 2021-11-29 ENCOUNTER — APPOINTMENT (OUTPATIENT)
Dept: GENERAL RADIOLOGY | Age: 81
End: 2021-11-29
Payer: MEDICARE

## 2021-11-29 LAB
ALBUMIN SERPL-MCNC: 4.7 G/DL (ref 3.5–5.2)
ALP BLD-CCNC: 54 U/L (ref 35–104)
ALT SERPL-CCNC: 16 U/L (ref 0–32)
AMYLASE: 66 U/L (ref 20–100)
ANION GAP SERPL CALCULATED.3IONS-SCNC: 12 MMOL/L (ref 7–16)
APTT: 30.4 SEC (ref 24.5–35.1)
AST SERPL-CCNC: 14 U/L (ref 0–31)
BASOPHILS ABSOLUTE: 0.08 E9/L (ref 0–0.2)
BASOPHILS RELATIVE PERCENT: 0.9 % (ref 0–2)
BILIRUB SERPL-MCNC: 0.3 MG/DL (ref 0–1.2)
BUN BLDV-MCNC: 20 MG/DL (ref 6–23)
CALCIUM SERPL-MCNC: 10 MG/DL (ref 8.6–10.2)
CHLORIDE BLD-SCNC: 104 MMOL/L (ref 98–107)
CO2: 21 MMOL/L (ref 22–29)
CREAT SERPL-MCNC: 1.2 MG/DL (ref 0.5–1)
EOSINOPHILS ABSOLUTE: 0.4 E9/L (ref 0.05–0.5)
EOSINOPHILS RELATIVE PERCENT: 4.3 % (ref 0–6)
GFR AFRICAN AMERICAN: 52
GFR NON-AFRICAN AMERICAN: 43 ML/MIN/1.73
GLUCOSE BLD-MCNC: 154 MG/DL (ref 74–99)
HCT VFR BLD CALC: 35 % (ref 34–48)
HEMOGLOBIN: 11.9 G/DL (ref 11.5–15.5)
IMMATURE GRANULOCYTES #: 0.06 E9/L
IMMATURE GRANULOCYTES %: 0.6 % (ref 0–5)
INR BLD: 1.1
LACTIC ACID, SEPSIS: 2.4 MMOL/L (ref 0.5–1.9)
LIPASE: 24 U/L (ref 13–60)
LYMPHOCYTES ABSOLUTE: 3.15 E9/L (ref 1.5–4)
LYMPHOCYTES RELATIVE PERCENT: 33.7 % (ref 20–42)
MCH RBC QN AUTO: 31.7 PG (ref 26–35)
MCHC RBC AUTO-ENTMCNC: 34 % (ref 32–34.5)
MCV RBC AUTO: 93.3 FL (ref 80–99.9)
MONOCYTES ABSOLUTE: 0.76 E9/L (ref 0.1–0.95)
MONOCYTES RELATIVE PERCENT: 8.1 % (ref 2–12)
NEUTROPHILS ABSOLUTE: 4.89 E9/L (ref 1.8–7.3)
NEUTROPHILS RELATIVE PERCENT: 52.4 % (ref 43–80)
PDW BLD-RTO: 13.7 FL (ref 11.5–15)
PLATELET # BLD: 297 E9/L (ref 130–450)
PMV BLD AUTO: 9.4 FL (ref 7–12)
POTASSIUM REFLEX MAGNESIUM: 4.6 MMOL/L (ref 3.5–5)
PROTHROMBIN TIME: 12.5 SEC (ref 9.3–12.4)
RBC # BLD: 3.75 E12/L (ref 3.5–5.5)
SODIUM BLD-SCNC: 137 MMOL/L (ref 132–146)
TOTAL PROTEIN: 8.1 G/DL (ref 6.4–8.3)
WBC # BLD: 9.3 E9/L (ref 4.5–11.5)

## 2021-11-29 PROCEDURE — 85610 PROTHROMBIN TIME: CPT

## 2021-11-29 PROCEDURE — 80053 COMPREHEN METABOLIC PANEL: CPT

## 2021-11-29 PROCEDURE — 85025 COMPLETE CBC W/AUTO DIFF WBC: CPT

## 2021-11-29 PROCEDURE — 85730 THROMBOPLASTIN TIME PARTIAL: CPT

## 2021-11-29 PROCEDURE — 83605 ASSAY OF LACTIC ACID: CPT

## 2021-11-29 PROCEDURE — 71045 X-RAY EXAM CHEST 1 VIEW: CPT

## 2021-11-29 PROCEDURE — 70450 CT HEAD/BRAIN W/O DYE: CPT

## 2021-11-29 PROCEDURE — 99284 EMERGENCY DEPT VISIT MOD MDM: CPT

## 2021-11-29 PROCEDURE — 87040 BLOOD CULTURE FOR BACTERIA: CPT

## 2021-11-29 PROCEDURE — 82150 ASSAY OF AMYLASE: CPT

## 2021-11-29 PROCEDURE — 83690 ASSAY OF LIPASE: CPT

## 2021-11-29 NOTE — ED NOTES
Department of Emergency Medicine  FIRST PROVIDER TRIAGE NOTE             Independent MLP         11/29/21  3:06 PM EST    Date of Encounter: 11/29/21   MRN: [id number]      HPI: Edwin Blake is a [de-identified] y.o. female who presents to the ED for Altered Mental Status (family noticed on thanksgiving pt didn't recognize family members. Doesn't have any of  her regular prescription medications in her home. Vey little food in her room)   FIRST CONTACT WITH PATIENT: 3:06 PM EST      ROS: Negative for vomiting, diarrhea or urinary complaints. PE: Gen Appearance/Constitutional: alert  CV: regular rate     Initial Plan of Care: All treatment areas with department are currently occupied. Plan to order/Initiate the following while awaiting opening in ED: labs, EKG and imaging studies.     Initial Plan of Care: Initiate Treatment-Testing, Proceed toTreatment Area When Bed Available for ED Attending/MLP to Continue Care    Electronically signed by RAKAN Saenz   DD: 11/29/21         RAKAN Lynn  11/29/21 7542

## 2021-11-30 ENCOUNTER — HOSPITAL ENCOUNTER (EMERGENCY)
Age: 81
Discharge: HOME | End: 2021-11-30
Attending: EMERGENCY MEDICINE
Payer: MEDICARE

## 2021-11-30 VITALS
TEMPERATURE: 98 F | RESPIRATION RATE: 14 BRPM | BODY MASS INDEX: 34.83 KG/M2 | DIASTOLIC BLOOD PRESSURE: 65 MMHG | OXYGEN SATURATION: 98 % | HEART RATE: 65 BPM | WEIGHT: 204 LBS | HEIGHT: 64 IN | SYSTOLIC BLOOD PRESSURE: 149 MMHG

## 2021-11-30 DIAGNOSIS — R41.82 ALTERED MENTAL STATUS, UNSPECIFIED ALTERED MENTAL STATUS TYPE: Primary | ICD-10-CM

## 2021-11-30 DIAGNOSIS — N30.00 ACUTE CYSTITIS WITHOUT HEMATURIA: ICD-10-CM

## 2021-11-30 DIAGNOSIS — F03.90 DEMENTIA WITHOUT BEHAVIORAL DISTURBANCE, UNSPECIFIED DEMENTIA TYPE: ICD-10-CM

## 2021-11-30 LAB
BACTERIA: ABNORMAL /HPF
BILIRUBIN URINE: NEGATIVE
BLOOD, URINE: NEGATIVE
CLARITY: CLEAR
COLOR: YELLOW
EKG ATRIAL RATE: 52 BPM
EKG P AXIS: 41 DEGREES
EKG P-R INTERVAL: 176 MS
EKG Q-T INTERVAL: 484 MS
EKG QRS DURATION: 90 MS
EKG QTC CALCULATION (BAZETT): 450 MS
EKG R AXIS: 14 DEGREES
EKG T AXIS: 25 DEGREES
EKG VENTRICULAR RATE: 52 BPM
GLUCOSE URINE: NEGATIVE MG/DL
KETONES, URINE: ABNORMAL MG/DL
LEUKOCYTE ESTERASE, URINE: ABNORMAL
NITRITE, URINE: NEGATIVE
PH UA: 5.5 (ref 5–9)
PROTEIN UA: ABNORMAL MG/DL
RBC UA: ABNORMAL /HPF (ref 0–2)
SPECIFIC GRAVITY UA: >=1.03 (ref 1–1.03)
UROBILINOGEN, URINE: 0.2 E.U./DL
WBC UA: ABNORMAL /HPF (ref 0–5)

## 2021-11-30 PROCEDURE — 93010 ELECTROCARDIOGRAM REPORT: CPT | Performed by: INTERNAL MEDICINE

## 2021-11-30 PROCEDURE — 81001 URINALYSIS AUTO W/SCOPE: CPT

## 2021-11-30 PROCEDURE — 87088 URINE BACTERIA CULTURE: CPT

## 2021-11-30 PROCEDURE — 6370000000 HC RX 637 (ALT 250 FOR IP): Performed by: EMERGENCY MEDICINE

## 2021-11-30 PROCEDURE — 93005 ELECTROCARDIOGRAM TRACING: CPT | Performed by: PHYSICIAN ASSISTANT

## 2021-11-30 RX ORDER — CEFDINIR 300 MG/1
300 CAPSULE ORAL ONCE
Status: COMPLETED | OUTPATIENT
Start: 2021-11-30 | End: 2021-11-30

## 2021-11-30 RX ORDER — CEFDINIR 300 MG/1
300 CAPSULE ORAL 2 TIMES DAILY
Qty: 14 CAPSULE | Refills: 0 | Status: SHIPPED | OUTPATIENT
Start: 2021-11-30 | End: 2021-12-07

## 2021-11-30 RX ADMIN — CEFDINIR 300 MG: 300 CAPSULE ORAL at 02:27

## 2021-11-30 NOTE — ED PROVIDER NOTES
HPI:  11/30/21,   Time: 1:27 AM PRABHAKAR Harrison is a [de-identified] y.o. female presenting to the ED for difficulty with her memory, beginning 1 week ago. The complaint has been intermittent, mild in severity, and worsened by nothing. The patient has been having difficulty with her memory for several months. The patient's daughter states that on Thanksgiving when they got together and they were around her they noticed it even more. She was forgetting who her family members were and forgetting to take her prescription medications. They state that this is just been progressively worsening over the last few months. Therefore they came to the ED to be evaluated. No chest pain shortness of breath. No fevers or chills. No abdominal pain. No nausea vomiting. No cough congestion. Review of Systems:   Pertinent positives and negatives are stated within HPI, all other systems reviewed and are negative.          --------------------------------------------- PAST HISTORY ---------------------------------------------  Past Medical History:  has a past medical history of ADHD (attention deficit hyperactivity disorder), Admission for long-term (current) use of medications, Birth defect, Depression, Diabetes mellitus ([facility] [address] Environmental allergies, Eye pain, Headache, Hyperlipidemia, Hypertension, Latex allergy, Osteoarthritis, Screening for colon cancer, and Tricuspid regurgitation. Past Surgical History:  has a past surgical history that includes Hysterectomy (1984 - [facility]); Toe Surgery (1994 - L-Big Toe Nail Rmoval ); Knee cartilage surgery (1999 - Right Knee - [facility]); Carpal tunnel release (2000 - Right Hand [facility]); Toe Surgery (2011 - R Big Toe Nail Removal ); Vein Surgery (3/11/2011 - Capped Vein Right Leg - [facility]);  Colonoscopy (7/29/2013); joint replacement (2009 - Left Hip [facility] [place]); joint replacement (2012  left knee); joint replacement (05/08/2018); and pr total knee arthroplasty (Right, 5/8/2018). Social History:  reports that she quit smoking about 26 years ago. She has a 25.00 pack-year smoking history. She has never used smokeless tobacco. She reports that she does not drink alcohol and does not use drugs. Family History: family history includes Alcohol Abuse in her sister; Diabetes in her mother; Heart Disease in her brother, father, maternal grandfather, and mother; Obesity in her brother; Parkinsonism in her mother. The patients home medications have been reviewed. Allergies: Latex and Eye drops [carboxymethylcellulose sodium]        ---------------------------------------------------PHYSICAL EXAM--------------------------------------    Constitutional/General: Alert and oriented x3, well appearing, non toxic in NAD  Head: Normocephalic and atraumatic  Eyes: PERRL, EOMI, conjunctive normal, sclera non icteric  Mouth: Oropharynx clear, handling secretions, no trismus, no asymmetry of the posterior oropharynx or uvular edema  Neck: Supple, full ROM, non tender to palpation in the midline, no stridor, no crepitus, no meningeal signs  Respiratory: Lungs clear to auscultation bilaterally, no wheezes, rales, or rhonchi. Not in respiratory distress  Cardiovascular:  Regular rate. Regular rhythm. No murmurs, gallops, or rubs. 2+ distal pulses  GI:  Abdomen Soft, Non tender, Non distended. +BS. No organomegaly, no palpable masses,  No rebound, guarding, or rigidity. Musculoskeletal: Moves all extremities x 4. Warm and well perfused, no clubbing, cyanosis, or edema. Capillary refill <3 seconds  Integument: skin warm and dry. No rashes.    Neurologic: GCS 15, no focal deficits, symmetric strength 5/5 in the upper and lower extremities bilaterally, cranial nerves II to XII intact, sensation intact all 4 extremities  Psychiatric: Normal Affect    -------------------------------------------------- RESULTS -------------------------------------------------  I have personally reviewed all laboratory and imaging results for this patient. Results are listed below.      LABS:  Results for orders placed or performed during the hospital encounter of 11/30/21   CBC auto differential   Result Value Ref Range    WBC 9.3 4.5 - 11.5 E9/L    RBC 3.75 3.50 - 5.50 E12/L    Hemoglobin 11.9 11.5 - 15.5 g/dL    Hematocrit 35.0 34.0 - 48.0 %    MCV 93.3 80.0 - 99.9 fL    MCH 31.7 26.0 - 35.0 pg    MCHC 34.0 32.0 - 34.5 %    RDW 13.7 11.5 - 15.0 fL    Platelets 894 229 - 649 E9/L    MPV 9.4 7.0 - 12.0 fL    Neutrophils % 52.4 43.0 - 80.0 %    Immature Granulocytes % 0.6 0.0 - 5.0 %    Lymphocytes % 33.7 20.0 - 42.0 %    Monocytes % 8.1 2.0 - 12.0 %    Eosinophils % 4.3 0.0 - 6.0 %    Basophils % 0.9 0.0 - 2.0 %    Neutrophils Absolute 4.89 1.80 - 7.30 E9/L    Immature Granulocytes # 0.06 E9/L    Lymphocytes Absolute 3.15 1.50 - 4.00 E9/L    Monocytes Absolute 0.76 0.10 - 0.95 E9/L    Eosinophils Absolute 0.40 0.05 - 0.50 E9/L    Basophils Absolute 0.08 0.00 - 0.20 E9/L   Comprehensive Metabolic Panel w/ Reflex to MG   Result Value Ref Range    Sodium 137 132 - 146 mmol/L    Potassium reflex Magnesium 4.6 3.5 - 5.0 mmol/L    Chloride 104 98 - 107 mmol/L    CO2 21 (L) 22 - 29 mmol/L    Anion Gap 12 7 - 16 mmol/L    Glucose 154 (H) 74 - 99 mg/dL    BUN 20 6 - 23 mg/dL    CREATININE 1.2 (H) 0.5 - 1.0 mg/dL    GFR Non-African American 43 >=60 mL/min/1.73    GFR African American 52     Calcium 10.0 8.6 - 10.2 mg/dL    Total Protein 8.1 6.4 - 8.3 g/dL    Albumin 4.7 3.5 - 5.2 g/dL    Total Bilirubin 0.3 0.0 - 1.2 mg/dL    Alkaline Phosphatase 54 35 - 104 U/L    ALT 16 0 - 32 U/L    AST 14 0 - 31 U/L   Urinalysis   Result Value Ref Range    Color, UA Yellow Straw/Yellow    Clarity, UA Clear Clear    Glucose, Ur Negative Negative mg/dL    Bilirubin Urine Negative Negative    Ketones, Urine TRACE (A) Negative mg/dL    Specific Gravity, UA >=1.030 1.005 - 1.030    Blood, Urine Negative Negative    pH, UA 5.5 5.0 - 9.0    Protein, UA TRACE Negative mg/dL    Urobilinogen, Urine 0.2 <2.0 E.U./dL    Nitrite, Urine Negative Negative    Leukocyte Esterase, Urine MODERATE (A) Negative   Lactate, Sepsis   Result Value Ref Range    Lactic Acid, Sepsis 2.4 (H) 0.5 - 1.9 mmol/L   APTT   Result Value Ref Range    aPTT 30.4 24.5 - 35.1 sec   Protime-INR   Result Value Ref Range    Protime 12.5 (H) 9.3 - 12.4 sec    INR 1.1    Amylase   Result Value Ref Range    Amylase 66 20 - 100 U/L   Lipase   Result Value Ref Range    Lipase 24 13 - 60 U/L   Microscopic Urinalysis   Result Value Ref Range    WBC, UA 2-5 0 - 5 /HPF    RBC, UA 0-1 0 - 2 /HPF    Bacteria, UA FEW (A) None Seen /HPF   EKG 12 lead   Result Value Ref Range    Ventricular Rate 52 BPM    Atrial Rate 52 BPM    P-R Interval 176 ms    QRS Duration 90 ms    Q-T Interval 484 ms    QTc Calculation (Bazett) 450 ms    P Axis 41 degrees    R Axis 14 degrees    T Axis 25 degrees       RADIOLOGY:  Interpreted by Radiologist.  CT HEAD WO CONTRAST   Final Result   1. No acute intracranial abnormality. Specifically, no acute intracranial   hemorrhage or mass effect. 2.  Extensive chronic small vessel ischemic disease. 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 3.  Ovoid partially calcified 1.3 x 1.0 cm mass along the lateral margin of   the left cavernous sinus is similar dating back to CT head done January 22, 2020 and is most consistent with a meningioma. XR CHEST 1 VIEW   Final Result   No radiographic evidence of acute cardiopulmonary disease process             EKG:  This EKG is signed and interpreted by the EP. EKG shows sinus bradycardia 52 bpm.  LVH noted. Normal axis. Normal QRS. No STEMI.      ------------------------- NURSING NOTES AND VITALS REVIEWED ---------------------------   The nursing notes within the ED encounter and vital signs as below have been reviewed by myself.   BP (!) 155/67   Pulse 62   Temp 97.9 °F (36.6 °C) (Oral)   Resp 14   Ht 5' 3.5\" (1.613 m) Wt 204 lb (92.5 kg)   SpO2 97%   BMI 35.57 kg/m²   Oxygen Saturation Interpretation: Normal    The patients available past medical records and past encounters were reviewed. ------------------------------ ED COURSE/MEDICAL DECISION MAKING----------------------  Medications - No data to display      ED COURSE:       Medical Decision Making: This is an 27-year-old female presented to the ED for difficulty with her memory. Patient underwent laboratory work-up which showed a normal CBC. Normal chemistry except for bicarb of 21 glucose 154 and a creatinine of 1.2. Lactic acid was 2.4. EKG showed no ischemic findings. Head CT unchanged from previous. Chest x-ray unremarkable. Patient's urinalysis did show some bacteria and WBCs. Patient's vital signs are stable. Patient is afebrile. Patient has a GCS of 15 and neurologically intact on my examination. The patient does appear to be demonstrating signs of new onset dementia. After extensive conversation with the patient the patient's daughter and myself they would like to try to keep the patient at home and do home services through their regular doctor. Patient was offered admission for placement but they politely declined. Therefore the patient will be discharged home. Family will stay with the patient to ensure she is safe and gets her medications properly. They will follow up with her PCP. Strict return precautions given. Patient agrees with plan. I, Dr. Chente Wheeler, am the primary provider for this encounter    This patient's ED course included: a personal history and physicial examination, re-evaluation prior to disposition and multiple bedside re-evaluations    This patient has remained hemodynamically stable during their ED course. Re-Evaluations:             Re-evaluation. Patients symptoms are improving      Counseling:    The emergency provider has spoken with the patient and discussed todays results, in addition to providing specific details for the plan of care and counseling regarding the diagnosis and prognosis. Questions are answered at this time and they are agreeable with the plan.       --------------------------------- IMPRESSION AND DISPOSITION ---------------------------------    IMPRESSION  1. Altered mental status, unspecified altered mental status type    2. Dementia without behavioral disturbance, [facility]    3. Acute cystitis without hematuria        DISPOSITION  Disposition: Discharge to home  Patient condition is stable    NOTE: This report was transcribed using voice recognition software.  Every effort was made to ensure accuracy; however, inadvertent computerized transcription errors may be present        Adrian DO Christine  11/30/21 0291

## 2021-12-02 LAB — URINE CULTURE, ROUTINE: NORMAL

## 2021-12-04 LAB — BLOOD CULTURE, ROUTINE: NORMAL

## 2023-10-16 ENCOUNTER — HOSPITAL ENCOUNTER (INPATIENT)
Age: 83
LOS: 2 days | Discharge: NURSING, REHAB OR LONG-TERM CARE | DRG: 205 | End: 2023-10-19
Attending: EMERGENCY MEDICINE | Admitting: INTERNAL MEDICINE
Payer: COMMERCIAL

## 2023-10-16 ENCOUNTER — APPOINTMENT (OUTPATIENT)
Dept: GENERAL RADIOLOGY | Age: 83
DRG: 205 | End: 2023-10-16
Payer: COMMERCIAL

## 2023-10-16 ENCOUNTER — APPOINTMENT (OUTPATIENT)
Dept: CT IMAGING | Age: 83
DRG: 205 | End: 2023-10-16
Payer: COMMERCIAL

## 2023-10-16 DIAGNOSIS — R06.00 DYSPNEA AND RESPIRATORY ABNORMALITIES: Primary | ICD-10-CM

## 2023-10-16 DIAGNOSIS — R06.89 DYSPNEA AND RESPIRATORY ABNORMALITIES: Primary | ICD-10-CM

## 2023-10-16 DIAGNOSIS — E87.20 LACTIC ACIDOSIS: ICD-10-CM

## 2023-10-16 DIAGNOSIS — J96.21 ACUTE ON CHRONIC RESPIRATORY FAILURE WITH HYPOXIA (HCC): ICD-10-CM

## 2023-10-16 LAB
ANION GAP SERPL CALCULATED.3IONS-SCNC: 15 MMOL/L (ref 7–16)
B PARAP IS1001 DNA NPH QL NAA+NON-PROBE: NOT DETECTED
B PERT DNA SPEC QL NAA+PROBE: NOT DETECTED
B.E.: 1 MMOL/L (ref -3–3)
BASOPHILS # BLD: 0.04 K/UL (ref 0–0.2)
BASOPHILS NFR BLD: 1 % (ref 0–2)
BNP SERPL-MCNC: 817 PG/ML (ref 0–450)
BUN SERPL-MCNC: 29 MG/DL (ref 6–23)
C PNEUM DNA NPH QL NAA+NON-PROBE: NOT DETECTED
CALCIUM SERPL-MCNC: 9.4 MG/DL (ref 8.6–10.2)
CHLORIDE SERPL-SCNC: 103 MMOL/L (ref 98–107)
CO2 SERPL-SCNC: 18 MMOL/L (ref 22–29)
COHB: 0.3 % (ref 0–1.5)
CREAT SERPL-MCNC: 1.2 MG/DL (ref 0.5–1)
CRITICAL: ABNORMAL
D DIMER: 439 NG/ML DDU (ref 0–232)
DATE ANALYZED: ABNORMAL
DATE OF COLLECTION: ABNORMAL
EOSINOPHIL # BLD: 0.19 K/UL (ref 0.05–0.5)
EOSINOPHILS RELATIVE PERCENT: 2 % (ref 0–6)
ERYTHROCYTE [DISTWIDTH] IN BLOOD BY AUTOMATED COUNT: 13.3 % (ref 11.5–15)
FLUAV RNA NPH QL NAA+NON-PROBE: NOT DETECTED
FLUBV RNA NPH QL NAA+NON-PROBE: NOT DETECTED
GFR SERPL CREATININE-BSD FRML MDRD: 43 ML/MIN/1.73M2
GLUCOSE SERPL-MCNC: 101 MG/DL (ref 74–99)
HADV DNA NPH QL NAA+NON-PROBE: NOT DETECTED
HCO3: 19.8 MMOL/L (ref 22–26)
HCOV 229E RNA NPH QL NAA+NON-PROBE: NOT DETECTED
HCOV HKU1 RNA NPH QL NAA+NON-PROBE: NOT DETECTED
HCOV NL63 RNA NPH QL NAA+NON-PROBE: NOT DETECTED
HCOV OC43 RNA NPH QL NAA+NON-PROBE: NOT DETECTED
HCT VFR BLD AUTO: 37.4 % (ref 34–48)
HGB BLD-MCNC: 12.4 G/DL (ref 11.5–15.5)
HHB: 1.8 % (ref 0–5)
HMPV RNA NPH QL NAA+NON-PROBE: NOT DETECTED
HPIV1 RNA NPH QL NAA+NON-PROBE: NOT DETECTED
HPIV2 RNA NPH QL NAA+NON-PROBE: NOT DETECTED
HPIV3 RNA NPH QL NAA+NON-PROBE: NOT DETECTED
HPIV4 RNA NPH QL NAA+NON-PROBE: NOT DETECTED
IMM GRANULOCYTES # BLD AUTO: 0.14 K/UL (ref 0–0.58)
IMM GRANULOCYTES NFR BLD: 2 % (ref 0–5)
LAB: ABNORMAL
LACTATE BLDV-SCNC: 3.1 MMOL/L (ref 0.5–2.2)
LYMPHOCYTES NFR BLD: 3.47 K/UL (ref 1.5–4)
LYMPHOCYTES RELATIVE PERCENT: 41 % (ref 20–42)
Lab: 1945
M PNEUMO DNA NPH QL NAA+NON-PROBE: NOT DETECTED
MCH RBC QN AUTO: 31.9 PG (ref 26–35)
MCHC RBC AUTO-ENTMCNC: 33.2 G/DL (ref 32–34.5)
MCV RBC AUTO: 96.1 FL (ref 80–99.9)
METHB: 0.2 % (ref 0–1.5)
MODE: ABNORMAL
MONOCYTES NFR BLD: 0.69 K/UL (ref 0.1–0.95)
MONOCYTES NFR BLD: 8 % (ref 2–12)
NEUTROPHILS NFR BLD: 46 % (ref 43–80)
NEUTS SEG NFR BLD: 3.93 K/UL (ref 1.8–7.3)
O2 CONTENT: 17.3 ML/DL
O2 SATURATION: 98.2 % (ref 92–98.5)
O2HB: 97.7 % (ref 94–97)
OPERATOR ID: 5100
PATIENT TEMP: 37 C
PCO2: 18.7 MMHG (ref 35–45)
PH BLOOD GAS: 7.64 (ref 7.35–7.45)
PLATELET # BLD AUTO: 222 K/UL (ref 130–450)
PMV BLD AUTO: 9.8 FL (ref 7–12)
PO2: 103.4 MMHG (ref 75–100)
POTASSIUM SERPL-SCNC: 5.6 MMOL/L (ref 3.5–5)
RBC # BLD AUTO: 3.89 M/UL (ref 3.5–5.5)
RSV RNA NPH QL NAA+NON-PROBE: NOT DETECTED
RV+EV RNA NPH QL NAA+NON-PROBE: DETECTED
SARS-COV-2 RNA NPH QL NAA+NON-PROBE: NOT DETECTED
SODIUM SERPL-SCNC: 136 MMOL/L (ref 132–146)
SOURCE, BLOOD GAS: ABNORMAL
SPECIMEN DESCRIPTION: ABNORMAL
THB: 12.5 G/DL (ref 11.5–16.5)
TIME ANALYZED: 1951
TROPONIN I SERPL HS-MCNC: 27 NG/L (ref 0–9)
WBC OTHER # BLD: 8.5 K/UL (ref 4.5–11.5)

## 2023-10-16 PROCEDURE — 83880 ASSAY OF NATRIURETIC PEPTIDE: CPT

## 2023-10-16 PROCEDURE — 71275 CT ANGIOGRAPHY CHEST: CPT

## 2023-10-16 PROCEDURE — 93005 ELECTROCARDIOGRAM TRACING: CPT | Performed by: EMERGENCY MEDICINE

## 2023-10-16 PROCEDURE — 83605 ASSAY OF LACTIC ACID: CPT

## 2023-10-16 PROCEDURE — 85379 FIBRIN DEGRADATION QUANT: CPT

## 2023-10-16 PROCEDURE — 85025 COMPLETE CBC W/AUTO DIFF WBC: CPT

## 2023-10-16 PROCEDURE — 0202U NFCT DS 22 TRGT SARS-COV-2: CPT

## 2023-10-16 PROCEDURE — 87040 BLOOD CULTURE FOR BACTERIA: CPT

## 2023-10-16 PROCEDURE — 80048 BASIC METABOLIC PNL TOTAL CA: CPT

## 2023-10-16 PROCEDURE — 84484 ASSAY OF TROPONIN QUANT: CPT

## 2023-10-16 PROCEDURE — 99285 EMERGENCY DEPT VISIT HI MDM: CPT

## 2023-10-16 PROCEDURE — 36415 COLL VENOUS BLD VENIPUNCTURE: CPT

## 2023-10-16 PROCEDURE — 82805 BLOOD GASES W/O2 SATURATION: CPT

## 2023-10-16 PROCEDURE — 71045 X-RAY EXAM CHEST 1 VIEW: CPT

## 2023-10-16 RX ORDER — SODIUM CHLORIDE 0.9 % (FLUSH) 0.9 %
10 SYRINGE (ML) INJECTION PRN
Status: DISCONTINUED | OUTPATIENT
Start: 2023-10-16 | End: 2023-10-19 | Stop reason: HOSPADM

## 2023-10-16 RX ORDER — 0.9 % SODIUM CHLORIDE 0.9 %
30 INTRAVENOUS SOLUTION INTRAVENOUS ONCE
Status: COMPLETED | OUTPATIENT
Start: 2023-10-16 | End: 2023-10-17

## 2023-10-16 NOTE — ED PROVIDER NOTES
HPI:  10/16/23, Time: 6:08 PM EDT         Frank James is a 81 y.o. female presenting to the ED for hypoxia cough found to be hypoxic with a pulse ox of 70% on room air. Patient is a poor historian has underlying psychiatric disorder. EMS stated they placed her on nonrebreather and patient refused to use the nonrebreather. All history is obtained from the transfer papers and EMS. Patient is unable to give me a adequate history. , beginning several hours ago. The complaint has been persistent, severe in severity, and worsened by nothing. Stated patient was wheezing they did give her a breathing treatment prior to arrival.      Review of Systems:   A complete review of systems was performed and pertinent positives and negatives are stated within HPI, all other systems reviewed and are negative.    --------------------------------------------- PAST HISTORY ---------------------------------------------  Past Medical History:  has a past medical history of ADHD (attention deficit hyperactivity disorder), Admission for long-term (current) use of medications, Birth defect, Depression, Diabetes mellitus ([address]), Environmental allergies, Eye pain, Headache, Hyperlipidemia, Hypertension, Latex allergy, Osteoarthritis, Screening for colon cancer, and Tricuspid regurgitation. Past Surgical History:  has a past surgical history that includes Hysterectomy (1984 - [facility]); Toe Surgery (1994 - L-Big Toe Nail Rmoval ); Knee cartilage surgery (1999 - Right Knee - [facility]); Carpal tunnel release (2000 - Right Hand [facility]); Toe Surgery (2011 - R Big Toe Nail Removal ); Vein Surgery (3/11/2011 - Capped Vein Right Leg - [facility]); Colonoscopy (7/29/2013); joint replacement (2009 - Left Hip [facility] [place]); joint replacement (2012  left knee); joint replacement (05/08/2018); and pr arthrp kne condyle&platu medial&lat compartments (Right, 5/8/2018). Social History:  reports that she quit smoking about 28 years ago.  She has a Specimen: Blood   Result Value Ref Range    Specimen Description . BLOOD . ARM     Special Requests          Culture NO GROWTH 1 DAY    Culture, Blood 2    Specimen: Blood   Result Value Ref Range    Specimen Description . BLOOD . ARM     Special Requests          Culture NO GROWTH 1 DAY    CBC with Auto Differential   Result Value Ref Range    WBC 8.5 4.5 - 11.5 k/uL    RBC 3.89 3.50 - 5.50 m/uL    Hemoglobin 12.4 11.5 - 15.5 g/dL    Hematocrit 37.4 34.0 - 48.0 %    MCV 96.1 80.0 - 99.9 fL    MCH 31.9 26.0 - 35.0 pg    MCHC 33.2 32.0 - 34.5 g/dL    RDW 13.3 11.5 - 15.0 %    Platelets 759 978 - 466 k/uL    MPV 9.8 7.0 - 12.0 fL    Neutrophils % 46 43.0 - 80.0 %    Lymphocytes % 41 20.0 - 42.0 %    Monocytes % 8 2.0 - 12.0 %    Eosinophils % 2 0 - 6 %    Basophils % 1 0.0 - 2.0 %    Immature Granulocytes 2 0.0 - 5.0 %    Neutrophils Absolute 3.93 1.80 - 7.30 k/uL    Lymphocytes Absolute 3.47 1.50 - 4.00 k/uL    Monocytes Absolute 0.69 0.10 - 0.95 k/uL    Eosinophils Absolute 0.19 0.05 - 0.50 k/uL    Basophils Absolute 0.04 0.00 - 0.20 k/uL    Absolute Immature Granulocyte 0.14 0.00 - 0.58 k/uL   Basic Metabolic Panel   Result Value Ref Range    Sodium 136 132 - 146 mmol/L    Potassium 5.6 (H) 3.5 - 5.0 mmol/L    Chloride 103 98 - 107 mmol/L    CO2 18 (L) 22 - 29 mmol/L    Anion Gap 15 7 - 16 mmol/L    Glucose 101 (H) 74 - 99 mg/dL    BUN 29 (H) 6 - 23 mg/dL    Creatinine 1.2 (H) 0.50 - 1.00 mg/dL    Est, Glom Filt Rate 43 (L) >60 mL/min/1.73m2    Calcium 9.4 8.6 - 10.2 mg/dL   Troponin   Result Value Ref Range    Troponin, High Sensitivity 27 (H) 0 - 9 ng/L   Lactic Acid   Result Value Ref Range    Lactic Acid 3.1 (H) 0.5 - 2.2 mmol/L   Blood Gas, Arterial   Result Value Ref Range    Date Analyzed 20231016     Time Analyzed 1951     Source: Blood Arterial     pH, Blood Gas 7.642 (HH) 7.350 - 7.450    PCO2 18.7 (LL) 35.0 - 45.0 mmHg    PO2 103.4 (H) 75.0 - 100.0 mmHg    HCO3 19.8 (L) 22.0 - 26.0 mmol/L    B.E. 1.0 -3.0 - software.  Every effort was made to ensure accuracy; however, inadvertent computerized transcription errors may be present       Adrianne Buitrago DO  10/18/23 1419

## 2023-10-17 PROBLEM — R06.9 RESPIRATORY ABNORMALITIES: Status: ACTIVE | Noted: 2023-10-17

## 2023-10-17 PROBLEM — R09.02 HYPOXIA: Status: ACTIVE | Noted: 2023-10-17

## 2023-10-17 LAB
ANION GAP SERPL CALCULATED.3IONS-SCNC: 14 MMOL/L (ref 7–16)
BUN SERPL-MCNC: 25 MG/DL (ref 6–23)
CALCIUM SERPL-MCNC: 7.9 MG/DL (ref 8.6–10.2)
CHLORIDE SERPL-SCNC: 112 MMOL/L (ref 98–107)
CO2 SERPL-SCNC: 14 MMOL/L (ref 22–29)
CREAT SERPL-MCNC: 1.3 MG/DL (ref 0.5–1)
EKG ATRIAL RATE: 63 BPM
EKG P AXIS: 56 DEGREES
EKG P-R INTERVAL: 182 MS
EKG Q-T INTERVAL: 454 MS
EKG QRS DURATION: 82 MS
EKG QTC CALCULATION (BAZETT): 464 MS
EKG R AXIS: 56 DEGREES
EKG T AXIS: 77 DEGREES
EKG VENTRICULAR RATE: 63 BPM
GFR SERPL CREATININE-BSD FRML MDRD: 41 ML/MIN/1.73M2
GLUCOSE BLD-MCNC: 115 MG/DL (ref 74–99)
GLUCOSE BLD-MCNC: 117 MG/DL (ref 74–99)
GLUCOSE BLD-MCNC: 130 MG/DL (ref 74–99)
GLUCOSE BLD-MCNC: 181 MG/DL (ref 74–99)
GLUCOSE BLD-MCNC: 193 MG/DL (ref 74–99)
GLUCOSE BLD-MCNC: 57 MG/DL (ref 74–99)
GLUCOSE BLD-MCNC: 69 MG/DL
GLUCOSE BLD-MCNC: 69 MG/DL (ref 74–99)
GLUCOSE SERPL-MCNC: 88 MG/DL (ref 74–99)
HBA1C MFR BLD: 6.5 % (ref 4–5.6)
LACTATE BLDV-SCNC: 2.1 MMOL/L (ref 0.5–2.2)
LACTATE BLDV-SCNC: 3.1 MMOL/L (ref 0.5–2.2)
POTASSIUM SERPL-SCNC: 3.9 MMOL/L (ref 3.5–5)
PROCALCITONIN SERPL-MCNC: 0.11 NG/ML (ref 0–0.08)
SODIUM SERPL-SCNC: 140 MMOL/L (ref 132–146)

## 2023-10-17 PROCEDURE — 2500000003 HC RX 250 WO HCPCS: Performed by: EMERGENCY MEDICINE

## 2023-10-17 PROCEDURE — 6360000002 HC RX W HCPCS

## 2023-10-17 PROCEDURE — 6360000002 HC RX W HCPCS: Performed by: EMERGENCY MEDICINE

## 2023-10-17 PROCEDURE — 83605 ASSAY OF LACTIC ACID: CPT

## 2023-10-17 PROCEDURE — 6370000000 HC RX 637 (ALT 250 FOR IP)

## 2023-10-17 PROCEDURE — 2580000003 HC RX 258

## 2023-10-17 PROCEDURE — 93010 ELECTROCARDIOGRAM REPORT: CPT | Performed by: INTERNAL MEDICINE

## 2023-10-17 PROCEDURE — 2580000003 HC RX 258: Performed by: EMERGENCY MEDICINE

## 2023-10-17 PROCEDURE — 80048 BASIC METABOLIC PNL TOTAL CA: CPT

## 2023-10-17 PROCEDURE — 2500000003 HC RX 250 WO HCPCS

## 2023-10-17 PROCEDURE — 82962 GLUCOSE BLOOD TEST: CPT

## 2023-10-17 PROCEDURE — 83036 HEMOGLOBIN GLYCOSYLATED A1C: CPT

## 2023-10-17 PROCEDURE — 94640 AIRWAY INHALATION TREATMENT: CPT

## 2023-10-17 PROCEDURE — 84145 PROCALCITONIN (PCT): CPT

## 2023-10-17 PROCEDURE — 2060000000 HC ICU INTERMEDIATE R&B

## 2023-10-17 RX ORDER — ADHESIVE BANDAGE 3/4"
1 BANDAGE TOPICAL DAILY PRN
COMMUNITY
End: 2023-10-17

## 2023-10-17 RX ORDER — SODIUM CHLORIDE 9 MG/ML
INJECTION, SOLUTION INTRAVENOUS CONTINUOUS
Status: DISCONTINUED | OUTPATIENT
Start: 2023-10-17 | End: 2023-10-19

## 2023-10-17 RX ORDER — INSULIN LISPRO 100 [IU]/ML
0-4 INJECTION, SOLUTION INTRAVENOUS; SUBCUTANEOUS NIGHTLY
Status: DISCONTINUED | OUTPATIENT
Start: 2023-10-17 | End: 2023-10-19 | Stop reason: HOSPADM

## 2023-10-17 RX ORDER — IPRATROPIUM BROMIDE AND ALBUTEROL SULFATE 2.5; .5 MG/3ML; MG/3ML
1 SOLUTION RESPIRATORY (INHALATION)
Status: DISCONTINUED | OUTPATIENT
Start: 2023-10-17 | End: 2023-10-19 | Stop reason: HOSPADM

## 2023-10-17 RX ORDER — ENALAPRIL MALEATE 5 MG/1
5 TABLET ORAL DAILY
Status: DISCONTINUED | OUTPATIENT
Start: 2023-10-17 | End: 2023-10-19 | Stop reason: HOSPADM

## 2023-10-17 RX ORDER — DIAPER,BRIEF,INFANT-TODD,DISP
EACH MISCELLANEOUS PRN
COMMUNITY

## 2023-10-17 RX ORDER — DICYCLOMINE HYDROCHLORIDE 10 MG/1
20 CAPSULE ORAL 4 TIMES DAILY PRN
Status: DISCONTINUED | OUTPATIENT
Start: 2023-10-17 | End: 2023-10-19 | Stop reason: HOSPADM

## 2023-10-17 RX ORDER — ONDANSETRON 4 MG/1
4 TABLET, ORALLY DISINTEGRATING ORAL EVERY 8 HOURS PRN
Status: DISCONTINUED | OUTPATIENT
Start: 2023-10-17 | End: 2023-10-19 | Stop reason: HOSPADM

## 2023-10-17 RX ORDER — INSULIN LISPRO 100 [IU]/ML
0-4 INJECTION, SOLUTION INTRAVENOUS; SUBCUTANEOUS
Status: DISCONTINUED | OUTPATIENT
Start: 2023-10-17 | End: 2023-10-19 | Stop reason: HOSPADM

## 2023-10-17 RX ORDER — LOVASTATIN 20 MG/1
20 TABLET ORAL EVERY MORNING
COMMUNITY

## 2023-10-17 RX ORDER — DIVALPROEX SODIUM 125 MG/1
500 CAPSULE, COATED PELLETS ORAL NIGHTLY
COMMUNITY

## 2023-10-17 RX ORDER — GUAIFENESIN/DEXTROMETHORPHAN 100-10MG/5
5 SYRUP ORAL EVERY 4 HOURS PRN
Status: DISCONTINUED | OUTPATIENT
Start: 2023-10-17 | End: 2023-10-19 | Stop reason: HOSPADM

## 2023-10-17 RX ORDER — ACETAMINOPHEN 325 MG/1
650 TABLET ORAL EVERY 6 HOURS PRN
COMMUNITY

## 2023-10-17 RX ORDER — SODIUM CHLORIDE 9 MG/ML
INJECTION, SOLUTION INTRAVENOUS PRN
Status: DISCONTINUED | OUTPATIENT
Start: 2023-10-17 | End: 2023-10-19 | Stop reason: HOSPADM

## 2023-10-17 RX ORDER — ADHESIVE BANDAGE 3/4"
1 BANDAGE TOPICAL DAILY PRN
COMMUNITY

## 2023-10-17 RX ORDER — ATENOLOL 25 MG/1
25 TABLET ORAL 2 TIMES DAILY
Status: DISCONTINUED | OUTPATIENT
Start: 2023-10-17 | End: 2023-10-19 | Stop reason: HOSPADM

## 2023-10-17 RX ORDER — MEMANTINE HYDROCHLORIDE 10 MG/1
10 TABLET ORAL 2 TIMES DAILY
COMMUNITY

## 2023-10-17 RX ORDER — SODIUM CHLORIDE 0.9 % (FLUSH) 0.9 %
5-40 SYRINGE (ML) INJECTION EVERY 12 HOURS SCHEDULED
Status: DISCONTINUED | OUTPATIENT
Start: 2023-10-17 | End: 2023-10-19 | Stop reason: HOSPADM

## 2023-10-17 RX ORDER — INSULIN LISPRO 100 [IU]/ML
0-20 INJECTION, SOLUTION INTRAVENOUS; SUBCUTANEOUS
COMMUNITY

## 2023-10-17 RX ORDER — 0.9 % SODIUM CHLORIDE 0.9 %
500 INTRAVENOUS SOLUTION INTRAVENOUS ONCE
Status: COMPLETED | OUTPATIENT
Start: 2023-10-17 | End: 2023-10-17

## 2023-10-17 RX ORDER — ASPIRIN 81 MG/1
81 TABLET ORAL 2 TIMES DAILY
Status: DISCONTINUED | OUTPATIENT
Start: 2023-10-17 | End: 2023-10-19 | Stop reason: HOSPADM

## 2023-10-17 RX ORDER — HYDROXYZINE PAMOATE 25 MG/1
25 CAPSULE ORAL 2 TIMES DAILY
COMMUNITY

## 2023-10-17 RX ORDER — ACETAMINOPHEN 650 MG/1
650 SUPPOSITORY RECTAL EVERY 6 HOURS PRN
Status: DISCONTINUED | OUTPATIENT
Start: 2023-10-17 | End: 2023-10-19 | Stop reason: HOSPADM

## 2023-10-17 RX ORDER — DEXTROMETHORPHAN HYDROBROMIDE, GUAIFENESIN 5; 100 MG/5ML; MG/5ML
20 LIQUID ORAL EVERY 4 HOURS PRN
COMMUNITY

## 2023-10-17 RX ORDER — SODIUM CHLORIDE 0.9 % (FLUSH) 0.9 %
10 SYRINGE (ML) INJECTION PRN
Status: DISCONTINUED | OUTPATIENT
Start: 2023-10-17 | End: 2023-10-19 | Stop reason: HOSPADM

## 2023-10-17 RX ORDER — DEXTROSE MONOHYDRATE 100 MG/ML
INJECTION, SOLUTION INTRAVENOUS CONTINUOUS PRN
Status: DISCONTINUED | OUTPATIENT
Start: 2023-10-17 | End: 2023-10-19 | Stop reason: HOSPADM

## 2023-10-17 RX ORDER — DIVALPROEX SODIUM 125 MG/1
250 CAPSULE, COATED PELLETS ORAL 2 TIMES DAILY
COMMUNITY

## 2023-10-17 RX ORDER — ENOXAPARIN SODIUM 100 MG/ML
40 INJECTION SUBCUTANEOUS DAILY
Status: DISCONTINUED | OUTPATIENT
Start: 2023-10-17 | End: 2023-10-19 | Stop reason: HOSPADM

## 2023-10-17 RX ORDER — FAMOTIDINE 20 MG/1
20 TABLET, FILM COATED ORAL 2 TIMES DAILY PRN
Status: DISCONTINUED | OUTPATIENT
Start: 2023-10-17 | End: 2023-10-19 | Stop reason: HOSPADM

## 2023-10-17 RX ORDER — DONEPEZIL HYDROCHLORIDE 5 MG/1
10 TABLET, FILM COATED ORAL NIGHTLY
Status: DISCONTINUED | OUTPATIENT
Start: 2023-10-17 | End: 2023-10-19 | Stop reason: HOSPADM

## 2023-10-17 RX ORDER — DOCUSATE SODIUM 100 MG/1
100 CAPSULE, LIQUID FILLED ORAL 2 TIMES DAILY
Status: DISCONTINUED | OUTPATIENT
Start: 2023-10-17 | End: 2023-10-19 | Stop reason: HOSPADM

## 2023-10-17 RX ORDER — ONDANSETRON 2 MG/ML
4 INJECTION INTRAMUSCULAR; INTRAVENOUS EVERY 6 HOURS PRN
Status: DISCONTINUED | OUTPATIENT
Start: 2023-10-17 | End: 2023-10-19 | Stop reason: HOSPADM

## 2023-10-17 RX ORDER — TROSPIUM CHLORIDE 20 MG/1
20 TABLET, FILM COATED ORAL NIGHTLY
Status: DISCONTINUED | OUTPATIENT
Start: 2023-10-17 | End: 2023-10-19 | Stop reason: HOSPADM

## 2023-10-17 RX ORDER — INSULIN GLARGINE 100 [IU]/ML
30 INJECTION, SOLUTION SUBCUTANEOUS NIGHTLY
COMMUNITY

## 2023-10-17 RX ORDER — ACETAMINOPHEN 325 MG/1
650 TABLET ORAL EVERY 6 HOURS PRN
Status: DISCONTINUED | OUTPATIENT
Start: 2023-10-17 | End: 2023-10-19 | Stop reason: HOSPADM

## 2023-10-17 RX ORDER — CLOPIDOGREL BISULFATE 75 MG/1
75 TABLET ORAL DAILY
Status: DISCONTINUED | OUTPATIENT
Start: 2023-10-17 | End: 2023-10-19 | Stop reason: HOSPADM

## 2023-10-17 RX ORDER — MECLIZINE HCL 12.5 MG/1
12.5 TABLET ORAL 3 TIMES DAILY PRN
COMMUNITY

## 2023-10-17 RX ADMIN — CEFEPIME 2000 MG: 2 INJECTION, POWDER, FOR SOLUTION INTRAVENOUS at 00:37

## 2023-10-17 RX ADMIN — ATENOLOL 25 MG: 25 TABLET ORAL at 21:58

## 2023-10-17 RX ADMIN — SODIUM CHLORIDE: 9 INJECTION, SOLUTION INTRAVENOUS at 02:49

## 2023-10-17 RX ADMIN — IPRATROPIUM BROMIDE AND ALBUTEROL SULFATE 1 DOSE: 2.5; .5 SOLUTION RESPIRATORY (INHALATION) at 20:46

## 2023-10-17 RX ADMIN — DOXYCYCLINE 100 MG: 100 INJECTION, POWDER, LYOPHILIZED, FOR SOLUTION INTRAVENOUS at 01:32

## 2023-10-17 RX ADMIN — IPRATROPIUM BROMIDE AND ALBUTEROL SULFATE 1 DOSE: 2.5; .5 SOLUTION RESPIRATORY (INHALATION) at 06:35

## 2023-10-17 RX ADMIN — DEXTROSE MONOHYDRATE 125 ML: 100 INJECTION, SOLUTION INTRAVENOUS at 06:53

## 2023-10-17 RX ADMIN — IPRATROPIUM BROMIDE AND ALBUTEROL SULFATE 1 DOSE: 2.5; .5 SOLUTION RESPIRATORY (INHALATION) at 12:24

## 2023-10-17 RX ADMIN — DOCUSATE SODIUM 100 MG: 100 CAPSULE, LIQUID FILLED ORAL at 21:50

## 2023-10-17 RX ADMIN — CEFEPIME 2000 MG: 2 INJECTION, POWDER, FOR SOLUTION INTRAVENOUS at 13:50

## 2023-10-17 RX ADMIN — ASPIRIN 81 MG: 81 TABLET, COATED ORAL at 09:54

## 2023-10-17 RX ADMIN — DOCUSATE SODIUM 100 MG: 100 CAPSULE, LIQUID FILLED ORAL at 09:55

## 2023-10-17 RX ADMIN — SODIUM CHLORIDE 500 ML: 9 INJECTION, SOLUTION INTRAVENOUS at 04:51

## 2023-10-17 RX ADMIN — ENALAPRIL MALEATE 5 MG: 5 TABLET ORAL at 09:54

## 2023-10-17 RX ADMIN — ENOXAPARIN SODIUM 40 MG: 100 INJECTION SUBCUTANEOUS at 09:55

## 2023-10-17 RX ADMIN — ASPIRIN 81 MG: 81 TABLET, COATED ORAL at 21:49

## 2023-10-17 RX ADMIN — SODIUM CHLORIDE, PRESERVATIVE FREE 10 ML: 5 INJECTION INTRAVENOUS at 21:50

## 2023-10-17 RX ADMIN — CLOPIDOGREL BISULFATE 75 MG: 75 TABLET ORAL at 09:54

## 2023-10-17 RX ADMIN — SODIUM CHLORIDE 2775 ML: 9 INJECTION, SOLUTION INTRAVENOUS at 01:36

## 2023-10-17 RX ADMIN — DOXYCYCLINE 100 MG: 100 INJECTION, POWDER, LYOPHILIZED, FOR SOLUTION INTRAVENOUS at 23:23

## 2023-10-17 RX ADMIN — DONEPEZIL HYDROCHLORIDE 10 MG: 5 TABLET, FILM COATED ORAL at 21:49

## 2023-10-17 RX ADMIN — DOXYCYCLINE 100 MG: 100 INJECTION, POWDER, LYOPHILIZED, FOR SOLUTION INTRAVENOUS at 12:37

## 2023-10-17 RX ADMIN — DEXTROSE MONOHYDRATE 125 ML: 100 INJECTION, SOLUTION INTRAVENOUS at 06:22

## 2023-10-17 NOTE — CARE COORDINATION
Social Work/ Transition of Care:    Pt presents to the ED secondary to shortness of breath from [address] [place] [address]. Pt is a full code. Pt is admitted inpatient with respiratory abnormalities. Per liaison at [facility], pt is a long term care bed hold and able to return upon discharge. SW/CM to follow.

## 2023-10-17 NOTE — PROGRESS NOTES
4 Eyes Skin Assessment     NAME:  Franca Jennings  YOB: 1940  MEDICAL RECORD NUMBER:  [id number]    The patient is being assessed for  Admission    I agree that at least one RN has performed a thorough Head to Toe Skin Assessment on the patient. ALL assessment sites listed below have been assessed. Areas assessed by both nurses:    Head, Face, Ears, Shoulders, Back, Chest, Arms, Elbows, Hands, Sacrum. Buttock, Coccyx, Ischium, and Legs. Feet and Heels        Does the Patient have a Wound? Yes wound(s) were present on assessment.  LDA wound assessment was Initiated and completed by RN       Jayant Prevention initiated by RN: Yes  Wound Care Orders initiated by RN: Yes    Pressure Injury (Stage 3,4, Unstageable, DTI, NWPT, and Complex wounds) if present, place Wound referral order by RN under : No    New Ostomies, if present place, Ostomy referral order under : No     Nurse 1 eSignature: Electronically signed by Annette Salazar RN on 10/17/23 at [address] PM EDT    **SHARE this note so that the co-signing nurse can place an eSignature**    Nurse 2 eSignature: Electronically signed by Fara Murdock RN on 10/17/23 at 6:15 PM EDT

## 2023-10-17 NOTE — H&P
insight. Normal mood and affect. Neuro: Alert and interactive, face symmetric, speech fluent. LABS:  All labs reviewed. Of note:  CBC with Differential:    Lab Results   Component Value Date/Time    WBC 8.5 10/16/2023 06:13 PM    RBC 3.89 10/16/2023 06:13 PM    HGB 12.4 10/16/2023 06:13 PM    HCT 37.4 10/16/2023 06:13 PM     10/16/2023 06:13 PM    MCV 96.1 10/16/2023 06:13 PM    MCH 31.9 10/16/2023 06:13 PM    MCHC 33.2 10/16/2023 06:13 PM    RDW 13.3 10/16/2023 06:13 PM    LYMPHOPCT 41 10/16/2023 06:13 PM    MONOPCT 8 10/16/2023 06:13 PM    BASOPCT 1 10/16/2023 06:13 PM    MONOSABS 0.69 10/16/2023 06:13 PM    LYMPHSABS 3.47 10/16/2023 06:13 PM    EOSABS 0.19 10/16/2023 06:13 PM    BASOSABS 0.04 10/16/2023 06:13 PM     CMP:    Lab Results   Component Value Date/Time     10/17/2023 05:31 AM    K 3.9 10/17/2023 05:31 AM    K 4.6 11/29/2021 06:56 PM     10/17/2023 05:31 AM    CO2 14 10/17/2023 05:31 AM    BUN 25 10/17/2023 05:31 AM    CREATININE 1.3 10/17/2023 05:31 AM    GFRAA 52 11/29/2021 06:56 PM    LABGLOM 41 10/17/2023 05:31 AM    GLUCOSE 69 10/17/2023 06:57 AM    PROT 8.1 11/29/2021 06:56 PM    LABALBU 4.7 11/29/2021 06:56 PM    CALCIUM 7.9 10/17/2023 05:31 AM    BILITOT 0.3 11/29/2021 06:56 PM    ALKPHOS 54 11/29/2021 06:56 PM    AST 14 11/29/2021 06:56 PM    ALT 16 11/29/2021 06:56 PM       Imaging:  CXR: No acute process. CT pulmonary: No acute PE. No aneurysmal formation or dissection of the thoracic aorta. Discrete groundglass density in the central areas of the lower lobe could represent discrete area alveolitis or pneumonitis. Development of infrahilar adenopathy bilaterally since the study of January 2021.     EKG:  I've personally reviewed the patient's EKG:  NSR    Telemetry:  I've personally reviewed the patient's telemetry:      ASSESSMENT/PLAN:  Principal Problem:    Respiratory abnormalities  Active Problems:    Hypoxia  Resolved Problems:    * No resolved hospital problems. *    81 year old female with a history of diabetes, hypertension presents to the ED with complaints of hypoxia and cough is admitted to telemetry unit with    Respiratory distress of unknown etiology  -Mild elevation in lactic acid 3.1 procalcitonin 0.11  -Supplement O2 demands keeping oxygen saturation greater than 92%. Patient currently on room air  -Doxycycline/cefepime  -DuoNebs   -Strep/Legionella  -Check respiratory panel, complete  -Provide supportive care  -CT chest with discrete groundglass density left lower lobe concerning for pneumonitis/alveolitis along with infrahilar apathy-likely reactive  -Oxygen saturations in mid to hi 90s  -Ambulating pulse ox in preparation for discharge    Diabetes Mellitus  -ISS glucose control  -Hypoglycemia protocol initiated  HgbA1C - 6.5    Medication for other comorbidities continue as appropriate dose adjustment as necessary.     DVT prophylaxis  PT OT  Discharge planning      Code status: Full  Requires inpatient level of care    Nelson King MD

## 2023-10-18 LAB
ALBUMIN SERPL-MCNC: 2.9 G/DL (ref 3.5–5.2)
ALBUMIN SERPL-MCNC: 3 G/DL (ref 3.5–5.2)
ALP SERPL-CCNC: 42 U/L (ref 35–104)
ALP SERPL-CCNC: 46 U/L (ref 35–104)
ALT SERPL-CCNC: 10 U/L (ref 0–32)
ALT SERPL-CCNC: 12 U/L (ref 0–32)
AMMONIA PLAS-SCNC: 25 UMOL/L (ref 11–51)
ANION GAP SERPL CALCULATED.3IONS-SCNC: 10 MMOL/L (ref 7–16)
ANION GAP SERPL CALCULATED.3IONS-SCNC: 15 MMOL/L (ref 7–16)
ANION GAP SERPL CALCULATED.3IONS-SCNC: 15 MMOL/L (ref 7–16)
AST SERPL-CCNC: 15 U/L (ref 0–31)
AST SERPL-CCNC: 21 U/L (ref 0–31)
B.E.: -6.9 MMOL/L (ref -3–3)
BASOPHILS # BLD: 0.04 K/UL (ref 0–0.2)
BASOPHILS # BLD: 0.06 K/UL (ref 0–0.2)
BASOPHILS # BLD: 0.07 K/UL (ref 0–0.2)
BASOPHILS NFR BLD: 1 % (ref 0–2)
BILIRUB SERPL-MCNC: 0.2 MG/DL (ref 0–1.2)
BILIRUB SERPL-MCNC: 0.3 MG/DL (ref 0–1.2)
BUN SERPL-MCNC: 19 MG/DL (ref 6–23)
BUN SERPL-MCNC: 19 MG/DL (ref 6–23)
BUN SERPL-MCNC: 20 MG/DL (ref 6–23)
CALCIUM SERPL-MCNC: 8 MG/DL (ref 8.6–10.2)
CALCIUM SERPL-MCNC: 8.4 MG/DL (ref 8.6–10.2)
CALCIUM SERPL-MCNC: 8.6 MG/DL (ref 8.6–10.2)
CHLORIDE SERPL-SCNC: 112 MMOL/L (ref 98–107)
CHLORIDE SERPL-SCNC: 113 MMOL/L (ref 98–107)
CHLORIDE SERPL-SCNC: 114 MMOL/L (ref 98–107)
CO2 SERPL-SCNC: 13 MMOL/L (ref 22–29)
CO2 SERPL-SCNC: 13 MMOL/L (ref 22–29)
CO2 SERPL-SCNC: 15 MMOL/L (ref 22–29)
COHB: 0.4 % (ref 0–1.5)
CREAT SERPL-MCNC: 1.2 MG/DL (ref 0.5–1)
CRITICAL: ABNORMAL
DATE ANALYZED: ABNORMAL
DATE OF COLLECTION: ABNORMAL
EKG ATRIAL RATE: 55 BPM
EKG P AXIS: 78 DEGREES
EKG P-R INTERVAL: 172 MS
EKG Q-T INTERVAL: 456 MS
EKG QRS DURATION: 94 MS
EKG QTC CALCULATION (BAZETT): 436 MS
EKG R AXIS: 14 DEGREES
EKG T AXIS: 52 DEGREES
EKG VENTRICULAR RATE: 55 BPM
EOSINOPHIL # BLD: 0.27 K/UL (ref 0.05–0.5)
EOSINOPHIL # BLD: 0.3 K/UL (ref 0.05–0.5)
EOSINOPHIL # BLD: 0.37 K/UL (ref 0.05–0.5)
EOSINOPHILS RELATIVE PERCENT: 4 % (ref 0–6)
ERYTHROCYTE [DISTWIDTH] IN BLOOD BY AUTOMATED COUNT: 13.7 % (ref 11.5–15)
ERYTHROCYTE [DISTWIDTH] IN BLOOD BY AUTOMATED COUNT: 13.8 % (ref 11.5–15)
ERYTHROCYTE [DISTWIDTH] IN BLOOD BY AUTOMATED COUNT: 13.9 % (ref 11.5–15)
GFR SERPL CREATININE-BSD FRML MDRD: 44 ML/MIN/1.73M2
GFR SERPL CREATININE-BSD FRML MDRD: 45 ML/MIN/1.73M2
GFR SERPL CREATININE-BSD FRML MDRD: 45 ML/MIN/1.73M2
GLUCOSE BLD-MCNC: 116 MG/DL (ref 74–99)
GLUCOSE BLD-MCNC: 130 MG/DL (ref 74–99)
GLUCOSE BLD-MCNC: 139 MG/DL (ref 74–99)
GLUCOSE BLD-MCNC: 165 MG/DL (ref 74–99)
GLUCOSE BLD-MCNC: 168 MG/DL (ref 74–99)
GLUCOSE SERPL-MCNC: 101 MG/DL (ref 74–99)
GLUCOSE SERPL-MCNC: 110 MG/DL (ref 74–99)
GLUCOSE SERPL-MCNC: 96 MG/DL (ref 74–99)
HCO3: 15.6 MMOL/L (ref 22–26)
HCT VFR BLD AUTO: 27.2 % (ref 34–48)
HCT VFR BLD AUTO: 30.6 % (ref 34–48)
HCT VFR BLD AUTO: 33 % (ref 34–48)
HGB BLD-MCNC: 10 G/DL (ref 11.5–15.5)
HGB BLD-MCNC: 8.6 G/DL (ref 11.5–15.5)
HGB BLD-MCNC: 9.5 G/DL (ref 11.5–15.5)
HHB: 2.4 % (ref 0–5)
IMM GRANULOCYTES # BLD AUTO: 0.07 K/UL (ref 0–0.58)
IMM GRANULOCYTES # BLD AUTO: 0.07 K/UL (ref 0–0.58)
IMM GRANULOCYTES # BLD AUTO: 0.1 K/UL (ref 0–0.58)
IMM GRANULOCYTES NFR BLD: 1 % (ref 0–5)
INR PPP: 1.3
LAB: ABNORMAL
LACTATE BLDV-SCNC: 1.3 MMOL/L (ref 0.5–2.2)
LACTATE BLDV-SCNC: 1.6 MMOL/L (ref 0.5–2.2)
LYMPHOCYTES NFR BLD: 2.78 K/UL (ref 1.5–4)
LYMPHOCYTES NFR BLD: 3.56 K/UL (ref 1.5–4)
LYMPHOCYTES NFR BLD: 3.62 K/UL (ref 1.5–4)
LYMPHOCYTES RELATIVE PERCENT: 41 % (ref 20–42)
LYMPHOCYTES RELATIVE PERCENT: 43 % (ref 20–42)
LYMPHOCYTES RELATIVE PERCENT: 44 % (ref 20–42)
Lab: 745
MAGNESIUM SERPL-MCNC: 1.6 MG/DL (ref 1.6–2.6)
MCH RBC QN AUTO: 31.6 PG (ref 26–35)
MCH RBC QN AUTO: 31.6 PG (ref 26–35)
MCH RBC QN AUTO: 32.1 PG (ref 26–35)
MCHC RBC AUTO-ENTMCNC: 30.3 G/DL (ref 32–34.5)
MCHC RBC AUTO-ENTMCNC: 31 G/DL (ref 32–34.5)
MCHC RBC AUTO-ENTMCNC: 31.6 G/DL (ref 32–34.5)
MCV RBC AUTO: 100 FL (ref 80–99.9)
MCV RBC AUTO: 101.7 FL (ref 80–99.9)
MCV RBC AUTO: 105.8 FL (ref 80–99.9)
METHB: 0.4 % (ref 0–1.5)
MODE: ABNORMAL
MONOCYTES NFR BLD: 0.78 K/UL (ref 0.1–0.95)
MONOCYTES NFR BLD: 0.84 K/UL (ref 0.1–0.95)
MONOCYTES NFR BLD: 0.87 K/UL (ref 0.1–0.95)
MONOCYTES NFR BLD: 10 % (ref 2–12)
MONOCYTES NFR BLD: 11 % (ref 2–12)
MONOCYTES NFR BLD: 11 % (ref 2–12)
NEUTROPHILS NFR BLD: 40 % (ref 43–80)
NEUTROPHILS NFR BLD: 41 % (ref 43–80)
NEUTROPHILS NFR BLD: 43 % (ref 43–80)
NEUTS SEG NFR BLD: 2.9 K/UL (ref 1.8–7.3)
NEUTS SEG NFR BLD: 3.23 K/UL (ref 1.8–7.3)
NEUTS SEG NFR BLD: 3.52 K/UL (ref 1.8–7.3)
O2 CONTENT: 14.6 ML/DL
O2 SATURATION: 97.6 % (ref 92–98.5)
O2HB: 96.8 % (ref 94–97)
OPERATOR ID: 8215
PATIENT TEMP: 37 C
PCO2: 23.3 MMHG (ref 35–45)
PH BLOOD GAS: 7.45 (ref 7.35–7.45)
PLATELET # BLD AUTO: 148 K/UL (ref 130–450)
PLATELET # BLD AUTO: 153 K/UL (ref 130–450)
PLATELET CONFIRMATION: NORMAL
PLATELET, FLUORESCENCE: 138 K/UL (ref 130–450)
PMV BLD AUTO: 10.2 FL (ref 7–12)
PMV BLD AUTO: 9.8 FL (ref 7–12)
PMV BLD AUTO: 9.8 FL (ref 7–12)
PO2: 105 MMHG (ref 75–100)
POTASSIUM SERPL-SCNC: 3.5 MMOL/L (ref 3.5–5)
POTASSIUM SERPL-SCNC: 3.97 MMOL/L (ref 3.5–5)
POTASSIUM SERPL-SCNC: 4 MMOL/L (ref 3.5–5)
POTASSIUM SERPL-SCNC: 4.2 MMOL/L (ref 3.5–5)
PROT SERPL-MCNC: 5.6 G/DL (ref 6.4–8.3)
PROT SERPL-MCNC: 6.3 G/DL (ref 6.4–8.3)
PROTHROMBIN TIME: 14.2 SEC (ref 9.3–12.4)
RBC # BLD AUTO: 2.72 M/UL (ref 3.5–5.5)
RBC # BLD AUTO: 3.01 M/UL (ref 3.5–5.5)
RBC # BLD AUTO: 3.12 M/UL (ref 3.5–5.5)
RBC # BLD: NORMAL 10*6/UL
SODIUM SERPL-SCNC: 138 MMOL/L (ref 132–146)
SODIUM SERPL-SCNC: 140 MMOL/L (ref 132–146)
SODIUM SERPL-SCNC: 142 MMOL/L (ref 132–146)
SOURCE, BLOOD GAS: ABNORMAL
THB: 10.6 G/DL (ref 11.5–16.5)
TIME ANALYZED: 753
WBC OTHER # BLD: 6.8 K/UL (ref 4.5–11.5)
WBC OTHER # BLD: 8.1 K/UL (ref 4.5–11.5)
WBC OTHER # BLD: 8.5 K/UL (ref 4.5–11.5)

## 2023-10-18 PROCEDURE — 94640 AIRWAY INHALATION TREATMENT: CPT

## 2023-10-18 PROCEDURE — 2060000000 HC ICU INTERMEDIATE R&B

## 2023-10-18 PROCEDURE — 83605 ASSAY OF LACTIC ACID: CPT

## 2023-10-18 PROCEDURE — 82805 BLOOD GASES W/O2 SATURATION: CPT

## 2023-10-18 PROCEDURE — 97161 PT EVAL LOW COMPLEX 20 MIN: CPT

## 2023-10-18 PROCEDURE — 2500000003 HC RX 250 WO HCPCS

## 2023-10-18 PROCEDURE — 82140 ASSAY OF AMMONIA: CPT

## 2023-10-18 PROCEDURE — 2700000000 HC OXYGEN THERAPY PER DAY

## 2023-10-18 PROCEDURE — 80048 BASIC METABOLIC PNL TOTAL CA: CPT

## 2023-10-18 PROCEDURE — 82962 GLUCOSE BLOOD TEST: CPT

## 2023-10-18 PROCEDURE — 6360000002 HC RX W HCPCS

## 2023-10-18 PROCEDURE — 93005 ELECTROCARDIOGRAM TRACING: CPT

## 2023-10-18 PROCEDURE — 80053 COMPREHEN METABOLIC PANEL: CPT

## 2023-10-18 PROCEDURE — 83735 ASSAY OF MAGNESIUM: CPT

## 2023-10-18 PROCEDURE — 85025 COMPLETE CBC W/AUTO DIFF WBC: CPT

## 2023-10-18 PROCEDURE — 6370000000 HC RX 637 (ALT 250 FOR IP)

## 2023-10-18 PROCEDURE — 2580000003 HC RX 258

## 2023-10-18 PROCEDURE — 84132 ASSAY OF SERUM POTASSIUM: CPT

## 2023-10-18 PROCEDURE — 97530 THERAPEUTIC ACTIVITIES: CPT

## 2023-10-18 PROCEDURE — 85610 PROTHROMBIN TIME: CPT

## 2023-10-18 PROCEDURE — 36415 COLL VENOUS BLD VENIPUNCTURE: CPT

## 2023-10-18 RX ORDER — LORAZEPAM 2 MG/ML
0.5 INJECTION INTRAMUSCULAR ONCE
Status: DISCONTINUED | OUTPATIENT
Start: 2023-10-18 | End: 2023-10-19 | Stop reason: HOSPADM

## 2023-10-18 RX ORDER — LORAZEPAM 2 MG/ML
INJECTION INTRAMUSCULAR
Status: DISCONTINUED
Start: 2023-10-18 | End: 2023-10-18 | Stop reason: WASHOUT

## 2023-10-18 RX ADMIN — IPRATROPIUM BROMIDE AND ALBUTEROL SULFATE 1 DOSE: 2.5; .5 SOLUTION RESPIRATORY (INHALATION) at 08:23

## 2023-10-18 RX ADMIN — CEFEPIME 2000 MG: 2 INJECTION, POWDER, FOR SOLUTION INTRAVENOUS at 00:32

## 2023-10-18 RX ADMIN — DOXYCYCLINE 100 MG: 100 INJECTION, POWDER, LYOPHILIZED, FOR SOLUTION INTRAVENOUS at 23:13

## 2023-10-18 RX ADMIN — ATENOLOL 25 MG: 25 TABLET ORAL at 19:54

## 2023-10-18 RX ADMIN — CEFEPIME 2000 MG: 2 INJECTION, POWDER, FOR SOLUTION INTRAVENOUS at 12:56

## 2023-10-18 RX ADMIN — DONEPEZIL HYDROCHLORIDE 10 MG: 5 TABLET, FILM COATED ORAL at 19:54

## 2023-10-18 RX ADMIN — DOCUSATE SODIUM 100 MG: 100 CAPSULE, LIQUID FILLED ORAL at 08:59

## 2023-10-18 RX ADMIN — IPRATROPIUM BROMIDE AND ALBUTEROL SULFATE 1 DOSE: 2.5; .5 SOLUTION RESPIRATORY (INHALATION) at 14:43

## 2023-10-18 RX ADMIN — ATENOLOL 25 MG: 25 TABLET ORAL at 08:58

## 2023-10-18 RX ADMIN — IPRATROPIUM BROMIDE AND ALBUTEROL SULFATE 1 DOSE: 2.5; .5 SOLUTION RESPIRATORY (INHALATION) at 11:14

## 2023-10-18 RX ADMIN — SODIUM CHLORIDE, PRESERVATIVE FREE 10 ML: 5 INJECTION INTRAVENOUS at 08:59

## 2023-10-18 RX ADMIN — CLOPIDOGREL BISULFATE 75 MG: 75 TABLET ORAL at 08:59

## 2023-10-18 RX ADMIN — ASPIRIN 81 MG: 81 TABLET, COATED ORAL at 08:59

## 2023-10-18 RX ADMIN — ENOXAPARIN SODIUM 40 MG: 100 INJECTION SUBCUTANEOUS at 08:59

## 2023-10-18 RX ADMIN — ASPIRIN 81 MG: 81 TABLET, COATED ORAL at 19:54

## 2023-10-18 RX ADMIN — IPRATROPIUM BROMIDE AND ALBUTEROL SULFATE 1 DOSE: 2.5; .5 SOLUTION RESPIRATORY (INHALATION) at 19:56

## 2023-10-18 RX ADMIN — SODIUM CHLORIDE: 9 INJECTION, SOLUTION INTRAVENOUS at 03:42

## 2023-10-18 RX ADMIN — DOXYCYCLINE 100 MG: 100 INJECTION, POWDER, LYOPHILIZED, FOR SOLUTION INTRAVENOUS at 11:44

## 2023-10-18 RX ADMIN — ENALAPRIL MALEATE 5 MG: 5 TABLET ORAL at 08:58

## 2023-10-18 RX ADMIN — TROSPIUM CHLORIDE 20 MG: 20 TABLET, FILM COATED ORAL at 19:54

## 2023-10-18 RX ADMIN — SODIUM CHLORIDE: 9 INJECTION, SOLUTION INTRAVENOUS at 23:10

## 2023-10-18 NOTE — CARE COORDINATION
Transition of care update. RN had called stroke alert this am due to patient being non responsive. Patient was placed on a non-rebreather mask at 15L. CT was cancelled per note. Per IDR with attending, possible discharge for tomorrow. Per liaison at [facility], pt is a long term care bed hold and able to return upon discharge. Ambulance form in envelope in soft chart. Destination and KOSTA completed. SW/CM to follow.     Electronically signed by Jennifer Brewster RN on 10/18/2023 at 10:25 AM

## 2023-10-18 NOTE — PROGRESS NOTES
Occupational Therapy  Facility/Department: [facility]  Occupational Therapy Initial Assessment    Name: Frank James  : 1940  MRN: [id number]  Date of Service: 10/18/2023  Room: 7402    Evaluating OT: Ирина Gold OTR/L   Referring Provider: Munir Addison CNP  Specific Provider Orders: 10/17/23  Recommended Adaptive Equipment: TBD      Diagnosis: RF- hypoxia & lactic acidosis    Pertinent Medical History: ADHA, CAD, CKD, HLD, HTN, hx cataract, ischemic cardiomyopathy & complete heart block  Precautions:  Fall Risk, pure wick, droplet, O2 NC & cognitive deficits    Assessment of current deficits   [x] Functional mobility  [x]ADLs  [] Strength               []Cognition   [x] Functional transfers   [x] IADLs         [x] Safety Awareness   [x]Endurance   [] Fine Coordination              [x] Balance      [] Vision/perception   []Sensation    []Gross Motor Coordination  [] ROM  [] Delirium                   [] Motor Control     OT PLAN OF CARE   OT POC based on physician orders, patient diagnosis and results of clinical assessment    Frequency/Duration:  1-3 days/wk for 2 weeks PRN   Specific OT Treatment to include:   * Instruction/training on adapted ADL techniques and AE recommendations to increase functional independence within precautions       * Training on energy conservation strategies, correct breathing pattern and techniques to improve independence/tolerance for self-care routine  * Patient/Family education to increase follow through with safety techniques and functional independence  * Recommendation of environmental modifications for increased safety with functional transfers/mobility and ADLs  * Cognitive retraining/development of therapeutic activities to improve problem solving, judgement, memory, and attention for increased safety/participation in ADL/IADL tasks  * Visual-perceptual training to improve environmental scanning, visual attention/focus, and oculomotor skills for increased professions/disciplines, assessment of data & development of POC/goals.      Time In: 1110      Time Out: 1125    Total treatment time: 10       Treatment Charges: Mins Units   OT Eval Low 13136 15 1   OT Eval Medium 54301     OT Eval High 80418     OT Re-Eval 45538     Ther Ex  60183       Manual Therapy 74574       Thera Activities 77233       ADL/Home Mgt 45491 10 1   Neuro Re-ed 84640       Group Therapy        Orthotic manage/training  21725       Non-Billable Time       Chuck Boykin OTR/L [id number]

## 2023-10-18 NOTE — PROGRESS NOTES
Physical Therapy    Initial Assessment     Name: Christel Pelayo  : 1940  MRN: [id number]      Date of Service: 10/18/2023    Evaluating PT: Pearl Charles PT, DPT [id number]      Room #:  0546/0978-E  Diagnosis:  Lactic acidosis [E87.20]  Respiratory abnormalities [R06.9]  Dyspnea and respiratory abnormalities [R06.00, R06.89]  Acute on chronic respiratory failure with hypoxia ([address]) [J96.21]  PMHx/PSHx:   has a past medical history of ADHD (attention deficit hyperactivity disorder), Admission for long-term (current) use of medications, Birth defect, Depression, Diabetes mellitus ([address]), Environmental allergies, Eye pain, Headache, Hyperlipidemia, Hypertension, Latex allergy, Osteoarthritis, Screening for colon cancer, and Tricuspid regurgitation. Procedure/Surgery:  NA  Precautions:  Fall risk, droplet isolation, O2, PureWick, cognition  Equipment Needs:  TBD    SUBJECTIVE:    Pt is poor historian. Per chart, pt was admitted from SNF. OBJECTIVE:   Initial Evaluation  Date: 10/18/23 Treatment Date: Short Term/ Long Term   Goals   AM-PAC 6 Clicks      Was pt agreeable to Eval/treatment? Yes     Does pt have pain? No complaints/indications of pain     Bed Mobility  Rolling: NT  Supine to sit: Max x2  Sit to supine: Max x2  Scooting: Max A  Rolling: Min A  Supine to sit: Min A  Sit to supine: Min A  Scooting: Min A   Transfers NT  Sit to stand: Mod A  Stand to sit: Mod A  Stand pivot: Mod A   Ambulation   NT  >5 feet with AAD with Mod A   Stair negotiation: ascended and descended NT  NA   ROM BUE: Refer to OT note  BLE: WFL     Strength BUE: Refer to OT note  BLE: 4/5     Balance Sitting EOB: Min-Max A  Dynamic Standing: NT  Sitting EOB: Supervision   Dynamic Standing: Mod A     Pt is A & O x: 1. Pt is oriented to self only. Sensation: Pt denies numbness and tingling of extremities. Edema: Unremarkable    Patient education  Pt educated on PT role in acute care setting.      Patient response to education: Provider    10/17/23 0445  PT evaluation and treat  Start:  10/17/23 0445,   End:  10/17/23 0445,   ONE TIME,   Standing Count:  1 Occurrences,   R         MORE Xie CNP      Diagnosis:  Lactic acidosis [E87.20]  Respiratory abnormalities [R06.9]  Dyspnea and respiratory abnormalities [R06.00, R06.89]  Acute on chronic respiratory failure with hypoxia (HCC) [J96.21]  Specific instructions for next treatment:  Progress activity, as tolerated. Current Treatment Recommendations:     [x] Strengthening to improve independence with functional mobility   [] ROM to improve independence with functional mobility   [x] Balance Training to improve static/dynamic balance and to reduce fall risk  [x] Endurance Training to improve activity tolerance during functional mobility   [x] Transfer Training to improve safety and independence with all functional transfers   [x] Gait Training to improve gait mechanics, endurance and assess need for appropriate assistive device  [] Stair Training in preparation for safe discharge home and/or into the community   [x] Positioning to prevent skin breakdown and contractures  [x] Safety and Education Training   [x] Patient/Caregiver Education   [] HEP  [] Other     PT long term treatment goals are located in above grid    Frequency of treatments: 2-5x/week x 1-2 weeks. Time in  945  Time out  1005    Total Treatment Time  8 minutes     Evaluation Time includes thorough review of current medical information, gathering information on past medical history/social history and prior level of function, completion of standardized testing/informal observation of tasks, assessment of data and education on plan of care and goals.     CPT codes:  [x] Low Complexity PT evaluation 87698  [] Moderate Complexity PT evaluation 27012  [] High Complexity PT evaluation 33376  [] PT Re-evaluation 46303  [] Gait training 39392 0 minutes  [] Manual therapy 59873 0 minutes  [x] Therapeutic activities

## 2023-10-19 VITALS
RESPIRATION RATE: 16 BRPM | DIASTOLIC BLOOD PRESSURE: 68 MMHG | OXYGEN SATURATION: 100 % | HEIGHT: 62 IN | SYSTOLIC BLOOD PRESSURE: 154 MMHG | HEART RATE: 68 BPM | TEMPERATURE: 97.4 F | WEIGHT: 204 LBS | BODY MASS INDEX: 37.54 KG/M2

## 2023-10-19 LAB
ANION GAP SERPL CALCULATED.3IONS-SCNC: 15 MMOL/L (ref 7–16)
ANION GAP SERPL CALCULATED.3IONS-SCNC: 9 MMOL/L (ref 7–16)
BUN SERPL-MCNC: 15 MG/DL (ref 6–23)
BUN SERPL-MCNC: 16 MG/DL (ref 6–23)
CALCIUM SERPL-MCNC: 8.4 MG/DL (ref 8.6–10.2)
CALCIUM SERPL-MCNC: 8.7 MG/DL (ref 8.6–10.2)
CHLORIDE SERPL-SCNC: 111 MMOL/L (ref 98–107)
CHLORIDE SERPL-SCNC: 115 MMOL/L (ref 98–107)
CO2 SERPL-SCNC: 12 MMOL/L (ref 22–29)
CO2 SERPL-SCNC: 16 MMOL/L (ref 22–29)
CREAT SERPL-MCNC: 1.1 MG/DL (ref 0.5–1)
CREAT SERPL-MCNC: 1.2 MG/DL (ref 0.5–1)
GFR SERPL CREATININE-BSD FRML MDRD: 47 ML/MIN/1.73M2
GFR SERPL CREATININE-BSD FRML MDRD: 48 ML/MIN/1.73M2
GLUCOSE BLD-MCNC: 120 MG/DL (ref 74–99)
GLUCOSE BLD-MCNC: 164 MG/DL (ref 74–99)
GLUCOSE BLD-MCNC: 207 MG/DL (ref 74–99)
GLUCOSE SERPL-MCNC: 168 MG/DL (ref 74–99)
GLUCOSE SERPL-MCNC: 99 MG/DL (ref 74–99)
MAGNESIUM SERPL-MCNC: 1.4 MG/DL (ref 1.6–2.6)
MAGNESIUM SERPL-MCNC: 1.8 MG/DL (ref 1.6–2.6)
POTASSIUM SERPL-SCNC: 3.2 MMOL/L (ref 3.5–5)
POTASSIUM SERPL-SCNC: 5.2 MMOL/L (ref 3.5–5)
SODIUM SERPL-SCNC: 136 MMOL/L (ref 132–146)
SODIUM SERPL-SCNC: 142 MMOL/L (ref 132–146)

## 2023-10-19 PROCEDURE — 6360000002 HC RX W HCPCS

## 2023-10-19 PROCEDURE — 6360000002 HC RX W HCPCS: Performed by: NURSE PRACTITIONER

## 2023-10-19 PROCEDURE — 97530 THERAPEUTIC ACTIVITIES: CPT

## 2023-10-19 PROCEDURE — 94640 AIRWAY INHALATION TREATMENT: CPT

## 2023-10-19 PROCEDURE — 2580000003 HC RX 258

## 2023-10-19 PROCEDURE — 6370000000 HC RX 637 (ALT 250 FOR IP)

## 2023-10-19 PROCEDURE — 2700000000 HC OXYGEN THERAPY PER DAY

## 2023-10-19 PROCEDURE — 2500000003 HC RX 250 WO HCPCS

## 2023-10-19 PROCEDURE — 80048 BASIC METABOLIC PNL TOTAL CA: CPT

## 2023-10-19 PROCEDURE — 36415 COLL VENOUS BLD VENIPUNCTURE: CPT

## 2023-10-19 PROCEDURE — 6370000000 HC RX 637 (ALT 250 FOR IP): Performed by: NURSE PRACTITIONER

## 2023-10-19 PROCEDURE — 83735 ASSAY OF MAGNESIUM: CPT

## 2023-10-19 PROCEDURE — 82962 GLUCOSE BLOOD TEST: CPT

## 2023-10-19 RX ORDER — ENOXAPARIN SODIUM 100 MG/ML
INJECTION SUBCUTANEOUS
Status: COMPLETED
Start: 2023-10-19 | End: 2023-10-19

## 2023-10-19 RX ORDER — DOXYCYCLINE HYCLATE 100 MG
100 TABLET ORAL 2 TIMES DAILY
Qty: 8 TABLET | Refills: 0 | DISCHARGE
Start: 2023-10-19 | End: 2023-10-23

## 2023-10-19 RX ORDER — POTASSIUM CHLORIDE 7.45 MG/ML
10 INJECTION INTRAVENOUS PRN
Status: DISCONTINUED | OUTPATIENT
Start: 2023-10-19 | End: 2023-10-19 | Stop reason: HOSPADM

## 2023-10-19 RX ORDER — MAGNESIUM SULFATE IN WATER 40 MG/ML
2000 INJECTION, SOLUTION INTRAVENOUS ONCE
Status: COMPLETED | OUTPATIENT
Start: 2023-10-19 | End: 2023-10-19

## 2023-10-19 RX ORDER — POTASSIUM CHLORIDE 20 MEQ/1
40 TABLET, EXTENDED RELEASE ORAL PRN
Status: DISCONTINUED | OUTPATIENT
Start: 2023-10-19 | End: 2023-10-19 | Stop reason: HOSPADM

## 2023-10-19 RX ORDER — CEFDINIR 300 MG/1
300 CAPSULE ORAL 2 TIMES DAILY
Qty: 8 CAPSULE | Refills: 0 | DISCHARGE
Start: 2023-10-19 | End: 2023-10-23

## 2023-10-19 RX ADMIN — ASPIRIN 81 MG: 81 TABLET, COATED ORAL at 09:05

## 2023-10-19 RX ADMIN — ATENOLOL 25 MG: 25 TABLET ORAL at 09:04

## 2023-10-19 RX ADMIN — CEFEPIME 2000 MG: 2 INJECTION, POWDER, FOR SOLUTION INTRAVENOUS at 13:45

## 2023-10-19 RX ADMIN — MAGNESIUM SULFATE HEPTAHYDRATE 2000 MG: 40 INJECTION, SOLUTION INTRAVENOUS at 09:12

## 2023-10-19 RX ADMIN — DOCUSATE SODIUM 100 MG: 100 CAPSULE, LIQUID FILLED ORAL at 09:05

## 2023-10-19 RX ADMIN — IPRATROPIUM BROMIDE AND ALBUTEROL SULFATE 1 DOSE: 2.5; .5 SOLUTION RESPIRATORY (INHALATION) at 08:03

## 2023-10-19 RX ADMIN — ENOXAPARIN SODIUM 40 MG: 100 INJECTION SUBCUTANEOUS at 09:06

## 2023-10-19 RX ADMIN — POTASSIUM BICARBONATE 40 MEQ: 782 TABLET, EFFERVESCENT ORAL at 09:03

## 2023-10-19 RX ADMIN — IPRATROPIUM BROMIDE AND ALBUTEROL SULFATE 1 DOSE: 2.5; .5 SOLUTION RESPIRATORY (INHALATION) at 11:36

## 2023-10-19 RX ADMIN — SODIUM CHLORIDE, PRESERVATIVE FREE 10 ML: 5 INJECTION INTRAVENOUS at 09:03

## 2023-10-19 RX ADMIN — DOXYCYCLINE 100 MG: 100 INJECTION, POWDER, LYOPHILIZED, FOR SOLUTION INTRAVENOUS at 12:40

## 2023-10-19 RX ADMIN — ENALAPRIL MALEATE 5 MG: 5 TABLET ORAL at 09:04

## 2023-10-19 RX ADMIN — CEFEPIME 2000 MG: 2 INJECTION, POWDER, FOR SOLUTION INTRAVENOUS at 00:29

## 2023-10-19 RX ADMIN — CLOPIDOGREL BISULFATE 75 MG: 75 TABLET ORAL at 09:04

## 2023-10-19 RX ADMIN — IPRATROPIUM BROMIDE AND ALBUTEROL SULFATE 1 DOSE: 2.5; .5 SOLUTION RESPIRATORY (INHALATION) at 16:13

## 2023-10-19 RX ADMIN — INSULIN LISPRO 1 UNITS: 100 INJECTION, SOLUTION INTRAVENOUS; SUBCUTANEOUS at 17:21

## 2023-10-19 NOTE — DISCHARGE SUMMARY
[facility]   Discharge summary   Patient ID:  Joss Rocha  [id number]  33 y.o.  1940    Admit date: 10/16/2023    Discharge date and time: 10/19/2023    Admission Diagnoses:   Patient Active Problem List   Diagnosis    Diabetes mellitus type 2, controlled ([address])    HTN (hypertension), benign    Mixed hyperlipidemia    Primary osteoarthritis of right knee    History of total right knee replacement    Obesity (BMI 30-39. 9)    Respiratory abnormalities    Hypoxia       Discharge Diagnoses: Respiratory abnormalities    Consults: none    Procedures: none    Hospital Course:     81year old female with a history of diabetes, hypertension presents to the ED with complaints of hypoxia and cough is admitted to telemetry unit with     Respiratory distress of unknown etiology  -Mild elevation in lactic acid 3.1 procalcitonin 0.11  -Supplement O2 demands keeping oxygen saturation greater than 92%.   Patient currently on room air  -Doxycycline/cefepime  -DuoNebs   -Strep/Legionella  -Check respiratory panel, complete  -Provide supportive care  -CT chest with discrete groundglass density left lower lobe concerning for pneumonitis/alveolitis along with infrahilar apathy-likely reactive  -Oxygen saturations in mid to hi 90s  -Ambulating pulse ox in preparation for discharge     Diabetes Mellitus  -ISS glucose control  -Hypoglycemia protocol initiated  HgbA1C - 6.5     10/18/2023  RRT this am for potential strokelike symptoms  Patient was hypercapnic  Placed on nonrebreather  No need for head ct  Patient has dementia unsure of baseline   Patient remained on unit  Patient currently on 2 liters with a saturation of 99%  AM Pac 8/24  Continue on Cefepime and doxy     10/19/23:  -Electrolyte abnormalities, replace potassium and magnesium  -Continue supportive care  -Satting 100% on 2 L, nursing communication to wean  -Continue IV antibiotics transition to p.o. on discharge  -Discharge back to facility following ONE XRAY VIEW OF THE CHEST 10/16/2023 6:23 pm COMPARISON: 11/29/2021 HISTORY: ORDERING SYSTEM PROVIDED HISTORY: SOB TECHNOLOGIST PROVIDED HISTORY: Reason for exam:->SOB What reading provider will be dictating this exam?->CRC FINDINGS: The lungs are without acute focal process. There is no effusion or pneumothorax. The cardiomediastinal silhouette is without acute process. The osseous structures are without acute process. No acute process. Discharge Exam:    HEENT: NCAT,  PERRLA, No JVD  Heart:  RRR, no murmurs, gallops, or rubs.   Lungs:  CTA bilaterally, no wheeze, rales or rhonchi  Abd: bowel sounds present, nontender, nondistended, no masses  Extrem:  No clubbing, cyanosis, or edema    Disposition: {disposition:18248}     Patient Condition at Discharge: ***    Patient Instructions:      Medication List        START taking these medications      cefdinir 300 MG capsule  Commonly known as: OMNICEF  Take 1 capsule by mouth 2 times daily for 4 days     doxycycline hyclate 100 MG tablet  Commonly known as: VIBRA-TABS  Take 1 tablet by mouth 2 times daily for 4 days            CONTINUE taking these medications      acetaminophen 325 MG tablet  Commonly known as: TYLENOL     atenolol 25 MG tablet  Commonly known as: TENORMIN     benzocaine-menthol 15-3.6 MG lozenge  Commonly known as: CEPACOL SORE THROAT     clopidogrel 75 MG tablet  Commonly known as: PLAVIX     dapagliflozin 5 MG tablet  Commonly known as: FARXIGA     * divalproex 125 MG DR capsule  Commonly known as: DEPAKOTE SPRINKLE     * divalproex 125 MG DR capsule  Commonly known as: DEPAKOTE SPRINKLE     donepezil 10 MG tablet  Commonly known as: ARICEPT     enalapril 5 MG tablet  Commonly known as: VASOTEC     Enema Disposable Enem     fish oil-omega-3 fatty acids 1000 MG capsule     HumaLOG KwikPen 100 UNIT/ML Sopn  Generic drug: insulin lispro (1 Unit Dial)     hydrocortisone 1 % cream     Lantus SoloStar 100 UNIT/ML injection pen  Generic drug: cyanosis, or edema    Disposition: [facility]     Patient Condition at Discharge: stable     Patient Instructions:      Medication List        START taking these medications      cefdinir 300 MG capsule  Commonly known as: OMNICEF  Take 1 capsule by mouth 2 times daily for 4 days     doxycycline hyclate 100 MG tablet  Commonly known as: VIBRA-TABS  Take 1 tablet by mouth 2 times daily for 4 days            CONTINUE taking these medications      acetaminophen 325 MG tablet  Commonly known as: TYLENOL     atenolol 25 MG tablet  Commonly known as: TENORMIN     benzocaine-menthol 15-3.6 MG lozenge  Commonly known as: CEPACOL SORE THROAT     clopidogrel 75 MG tablet  Commonly known as: PLAVIX     dapagliflozin 5 MG tablet  Commonly known as: FARXIGA     * divalproex 125 MG DR capsule  Commonly known as: DEPAKOTE SPRINKLE     * divalproex 125 MG DR capsule  Commonly known as: DEPAKOTE SPRINKLE     donepezil 10 MG tablet  Commonly known as: ARICEPT     enalapril 5 MG tablet  Commonly known as: VASOTEC     Enema Disposable Enem     fish oil-omega-3 fatty acids 1000 MG capsule     HumaLOG KwikPen 100 UNIT/ML Sopn  Generic drug: insulin lispro (1 Unit Dial)     hydrocortisone 1 % cream     Lantus SoloStar 100 UNIT/ML injection pen  Generic drug: insulin glargine     lovastatin 20 MG tablet  Commonly known as: MEVACOR     magnesium hydroxide 400 MG/5ML suspension  Commonly known as: MILK OF MAGNESIA     meclizine 12.5 MG tablet  Commonly known as: ANTIVERT     memantine 10 MG tablet  Commonly known as: NAMENDA     Robitussin Cough+Chest Edmar DM  MG/20ML Liqd  Generic drug: Dextromethorphan-guaiFENesin     Tradjenta 5 MG tablet  Generic drug: linagliptin     Vistaril 25 MG capsule  Generic drug: hydrOXYzine pamoate     VORTIoxetine HBr 20 MG Tabs tablet  Commonly known as: TRINTELLIX           * This list has 2 medication(s) that are the same as other medications prescribed for you.  Read the directions carefully, and ask your

## 2023-10-19 NOTE — PROGRESS NOTES
Occupational Therapy  Facility/Department: [facility]  Occupational Therapy TREATMENT SESSION    Name: Isidro Cook  : 1940  MRN: [id number]  Date of Service: 10/19/2023  Room: 7402    Evaluating OT: Katja Factor OTR/L   Referring Provider: Lore Hernandes CNP  Specific Provider Orders: 10/17/23  Recommended Adaptive Equipment: TBD      Diagnosis: RF- hypoxia & lactic acidosis    Pertinent Medical History: ADHA, CAD, CKD, HLD, HTN, hx cataract, ischemic cardiomyopathy & complete heart block  Precautions:  Fall Risk, pure wick, droplet, O2 NC & cognitive deficits, alarm    Assessment of current deficits   [x] Functional mobility  [x]ADLs  [] Strength               []Cognition   [x] Functional transfers   [x] IADLs         [x] Safety Awareness   [x]Endurance   [] Fine Coordination              [x] Balance      [] Vision/perception   []Sensation    []Gross Motor Coordination  [] ROM  [] Delirium                   [] Motor Control     OT PLAN OF CARE   OT POC based on physician orders, patient diagnosis and results of clinical assessment    Frequency/Duration:  1-3 days/wk for 2 weeks PRN   Specific OT Treatment to include:   * Instruction/training on adapted ADL techniques and AE recommendations to increase functional independence within precautions       * Training on energy conservation strategies, correct breathing pattern and techniques to improve independence/tolerance for self-care routine  * Patient/Family education to increase follow through with safety techniques and functional independence  * Recommendation of environmental modifications for increased safety with functional transfers/mobility and ADLs  * Cognitive retraining/development of therapeutic activities to improve problem solving, judgement, memory, and attention for increased safety/participation in ADL/IADL tasks  * Visual-perceptual training to improve environmental scanning, visual attention/focus, and oculomotor skills for increased solving:  poor    Judgement/safety:  poor    Functional Assessment:  AM-PAC Daily Activity Raw Score: 7/24   Initial Eval Status  Date: 10/18/23 Treatment Status  Date:10/19/23 STGs=LTGs  Time Frame: trial therapy   Feeding dependent Dep Min A   Grooming dependent Max A Mod A   UB Dressing dependent Dep  Pt limited by command following- requires max cues. Mod A   LB Dressing Dependent Dep Max A   Bathing dependent Dep Max A   Toileting NT- purewick Dep Max A   Bed Mobility  Roll>right dependent  Roll>left dependent   Roll: Max A  Sup >Sit: Max A  Sit >Sup: Max A    Cues for body mechanics/hand placement. Log roll right or left- Mod A  Supine <> sit: Mod A      Functional Transfers TBD    NT TBD   Functional Mobility U/a NT TBD   Balance                 Sitting:  Static: P+  Dynamic: P Sitting:     Static:  F+    Dynamic: F     Activity Tolerance F- F- Pt demo G- endurance for a 15-30 minute functional activity   Visual/  Perceptual   U/a to assess due to lethargy. Pt did track voice briefly when her eyes were open Pt tracked audio/visual stimuli    BUE AROM Pt demo limited movement supine in bed due to cognitive deficits & edema BUE  Pt will tolerate AAROM/PROM in all planes with mod vc's  Pt demo improved AROM BUE to >1/2 in all planes    Pt demo improved BUE strength to 3/5 to improve pt ability to complete low level feeding & grooming tasks      Hand dominance: R  UE PROM: BUE: >1/2 in all planes    Strength: BUE: grossly 2-/5    Strength: B poor  Fine Motor Coordination:  u/a finger to nose assessment   Comments- edema noted BUE    Hearing: impaired  Sensation:  u/a to assess due to cognitive deficits & lethargy  Tone:  WFL  Edema: BUE edema noted                            Comments:Cleared by RN to see pt. Upon arrival, patient  and agreeable to OT session. At end of session, patient  with call light and phone within reach, all lines and tubes intact.    Pt would benefit from continued  OT to increase

## 2023-10-19 NOTE — CARE COORDINATION
Transition of care update. Patient is currently on 2 L O2 per flow sheet, and RN to wean. K+ today is 3.2, and MG++ is 1.4. Per internal med note today, electrolyte abnormalities, to replace potassium and magnesium. Continue IV antibiotics transition to p.o. on discharge. Could likely discharge this afternoon following replacement of electrolytes. Patient is from [address] [place] [address] and is a long term care bed hold and able to return upon discharge. Ambulance form in envelope in soft chart. Destination and KOSTA completed. Transportation set up at 5 pm with Enmanuel Abdalla. If patient is not ok for discharge, cancel transport at [phone]. LUCAS Cunningham served NP Armin Bonilla regarding transport, and update RN/CM if she is not going to discharge. RN, Charge, Liaison, and daughter Ceasar Junior notified of discharge. CM/SW will follow.   Electronically signed by Robert Coker RN on 10/19/2023 at 12:01 PM

## 2023-10-21 LAB
MICROORGANISM SPEC CULT: NORMAL
MICROORGANISM SPEC CULT: NORMAL
SERVICE CMNT-IMP: NORMAL
SERVICE CMNT-IMP: NORMAL
SPECIMEN DESCRIPTION: NORMAL
SPECIMEN DESCRIPTION: NORMAL

## 2023-10-27 NOTE — PROGRESS NOTES
Physician Progress Note      PATIENT:               Efren Screen  CSN #:                  [id number]  :                       1940  ADMIT DATE:       10/16/2023 6:02 PM  [facility] DATE:        10/19/2023 5:37 PM  RESPONDING  PROVIDER #:        Chicho Lim MD          QUERY TEXT:    Patient admitted with Respiratory Distress of unknown etiology, noted to have   CT chest with discrete ground glass density left lower lobe. If possible,   please document in progress notes and discharge summary if you are evaluating   and/or treating any of the following: The medical record reflects the following:  Risk Factors: Hypoxia; Nursing home resident; HTN; DM; Positive for   Rhinovirus/Enterovirus  Clinical Indicators:  Per H&P: CT Chest with discrete ground glass density left lower lobe   concerning for pneumonitis/alveolitis along with infra hilar apathy--likely   reactive  10/16/23: Respiratory Panel: Rhino/enterovirus Detected  Treatment: Respiratory panel; laboratory studies; Maxapime IV; Vibramycin IV ;   droplet Isolation; Oxygen per protocol; Pulse oximetry monitoring; Imaging    Thank you,  Brenda Stevenson BSN, R.N.  Clinical Documentation Improvement  [phone]  Options provided:  -- CT Chest result clinically significant for pneumonia  -- CT Chest findings not clinically significant  -- Other - I will add my own diagnosis  -- Disagree - Not applicable / Not valid  -- Disagree - Clinically unable to determine / Unknown  -- Refer to Clinical Documentation Reviewer    PROVIDER RESPONSE TEXT:    This patient has CT Chest findings clinically significant for pneumonia. Query created by: Brenda Stevenson on 10/19/2023 2:15 PM      QUERY TEXT:    Patient admitted with Hypoxia. Noted documentation of Respiratory distress   of Unknown etiology per H&P and Acute on Chronic hypoxic respiratory failure   per RRT documentation on 10/18/23.   If possible, please document in progress notes and discharge summary if you   are evaluating and /or treating any of the following: The medical record reflects the following:  Risk Factors: Hypoxia; Cough; DM; HTN; Nursing home resident positive for   Rhinovirus/Enterovirus  Clinical Indicators:  Per ED: presented for hypoxia found to have pulse ox 70% on RA  Per H&P: hypoxia. Respiratory Distress of unknown etiology  10/18/23: RRT-Significant event documentation:  admitted for acute on chronic   respiratory failure with hypoxia. AMS likely 2/2 hypercapnia and baseline   dementia. Not able to speak in full sentences and unable to follow commands. ABG's were obtained and found to be hypercapnic-patient did not appear to be   hyperventilating. Placed on non rebreather. 10/16/23: No acute process  10/16/23: CTA Pulmonary: discrete ground -glass density in the central areas   of the lower lobe could represent discrete area alveolitis or pneumonitis. 10/16/23: ABG's on RA pH 7.642; Pc02 18.7; P02 103.4;  HC03 19.8;  02 sat 98.2  10/17/23: ABG's: on 2 liters: pH 7.445;  Pc02 23.3;  p02 105;  HC03 15.6;  02   sat 97.6  Respiratory Panel: Rhinovirus/Enterovirus detected  IN ED arrived on 15 liter non re-breather; 92% on 3 liters;  Treatment: Oxygen per protocol; ABG's; laboratory studies; Thank you,  Maddy Sanders BSN, R.N.  Clinical Documentation Improvement  [phone]  Options provided:  -- Respiratory Distress confirmed and Acute on chronic respiratory failure   with hypoxia ruled out  -- Acute on chronic respiratory failure with hypoxia confirmed and Respiratory   Distress ruled out  -- Other - I will add my own diagnosis  -- Disagree - Not applicable / Not valid  -- Disagree - Clinically unable to determine / Unknown  -- Refer to Clinical Documentation Reviewer    PROVIDER RESPONSE TEXT:    After study, Respiratory Distress confirmed and Acute on chronic respiratory   failure with hypoxia ruled out.     Query created by: Maddy Sanders on 10/19/2023 2:17

## 2023-11-03 NOTE — PROGRESS NOTES
Physician Progress Note      PATIENT:               Franca Jennings  CSN #:                  [id number]  :                       1940  ADMIT DATE:       10/16/2023 6:02 PM  [facility] DATE:        10/19/2023 5:37 PM  RESPONDING  PROVIDER #:        Martell Duarte MD          QUERY TEXT:    Pt admitted with Pneumonia. Pt noted to have Respiratory panel with   Rhinovirus/Enterovirus detected. If possible, please document in the progress   notes and discharge summary if you are evaluating and/or treating any of the   following:    Note: CAP and HCAP indicate where the pneumonia was acquired, not a specific   type. The medical record reflects the following:  Risk Factors: Rhinovirus/Enterovirus positive; Resident at a nursing facility;   81years old;  Clinical Indicators:  10/27/23: Per progress note--query answered: This patient has CT Chest   findings clinically significant for pneumonia  10/16/23: Respiratory Panel: Rhinovirus/Enterovirus: Detected  10/16/23: Chest CT: Discrete ground-glass density in the central areas of the   lower lobe could represent discrete area alveolitis or pneumonitis  Treatment: Vibramycin IV; Maxipime IV; Nebulizers; CT chest; Laboratory   studies; Thank you,  Marjorie Garzon BSN, R.N.  Clinical Documentation Improvement  [phone]  Options provided:  -- Gram negative pneumonia  -- Gram positive pneumonia  -- MSSA pneumonia  -- Viral pneumonia  -- Other - I will add my own diagnosis  -- Disagree - Not applicable / Not valid  -- Disagree - Clinically unable to determine / Unknown  -- Refer to Clinical Documentation Reviewer    PROVIDER RESPONSE TEXT:    Provider is clinically unable to determine a response to this query.   no cultures yet    Query created by: Marjorie Garzon on 10/30/2023 10:54 AM      Electronically signed by:  Martell Duarte MD 11/3/2023 12:39 PM

## (undated) DEVICE — PADDING CAST W6INXL4YD COT LO LINTING WYTEX

## (undated) DEVICE — TOWEL,OR,DSP,ST,BLUE,DLX,10/PK,8PK/CS: Brand: MEDLINE

## (undated) DEVICE — DRILL SYSTEM 7

## (undated) DEVICE — SOLUTION IV IRRIG POUR BRL 0.9% SODIUM CHL 2F7124

## (undated) DEVICE — TOTAL KNEE PK

## (undated) DEVICE — KIT PLT RATIO DISPNS KT 2IN CANN TIP SPRY TIP DISP MAGELLAN

## (undated) DEVICE — SHEET,DRAPE,53X77,STERILE: Brand: MEDLINE

## (undated) DEVICE — APPLICATOR PREP 26ML 0.7% IOD POVACRYLEX 74% ISO ALC ST

## (undated) DEVICE — GOWN,BREATHABLE SLV,AURORA,XLG,STRL: Brand: MEDLINE

## (undated) DEVICE — SET ORTHO STD STORTSTD1

## (undated) DEVICE — RACK TUBE CURETTE

## (undated) DEVICE — ZIMMER® STERILE DISPOSABLE TOURNIQUET CUFF WITH PROTECTIVE SLEEVE AND PLC, DUAL PORT, SINGLE BLADDER, 34 IN. (86 CM)

## (undated) DEVICE — SET LAMBOTTE

## (undated) DEVICE — GLOVE ORANGE PI 8   MSG9080

## (undated) DEVICE — SET TRIATHALON MISC INSTR TRAY

## (undated) DEVICE — SOLUTION IV IRRIG WATER 1000ML POUR BRL 2F7114

## (undated) DEVICE — Z INACTIVE USE 2660664 SOLUTION IRRIG 3000ML 0.9% SOD CHL USP UROMATIC PLAS CONT

## (undated) DEVICE — SET ORTHO STD STORTSTD2

## (undated) DEVICE — STRYKER PERFORMANCE SERIES SAGITTAL BLADE: Brand: STRYKER PERFORMANCE SERIES

## (undated) DEVICE — SYRINGE MED 50ML LUERLOCK TIP

## (undated) DEVICE — STANDARD HYPODERMIC NEEDLE,POLYPROPYLENE HUB: Brand: MONOJECT

## (undated) DEVICE — Z DISCONTINUED PER MEDLINE USE 2741944 DRESSING AQUACEL 12 IN SURG W9XL30CM SIL CVR WTRPRF VIR BACT BARR ANTIMIC

## (undated) DEVICE — ELECTRODE PT RET AD L9FT HI MOIST COND ADH HYDRGEL CORDED

## (undated) DEVICE — Device

## (undated) DEVICE — TAPE DSG 2INX10YD SILK HYPOALRG CURASILK

## (undated) DEVICE — ZINACTIVE USE 2539607 PIN FIX L3.5IN DIA1/8IN LNG HDLSS FOR MIS KNEE JT REPL

## (undated) DEVICE — BANDAGE COMPR W6INXL10YD ST M E WHITE/BEIGE

## (undated) DEVICE — SURGICAL PROCEDURE PACK BASIC

## (undated) DEVICE — HANDPIECE SET WITH BONE CLEANING TIP AND SUCTION TUBE: Brand: INTERPULSE

## (undated) DEVICE — SET TRIATHALON KNEE SZ 3-6 FEM/TIB TRIAL

## (undated) DEVICE — SET TRIATHALON KNEE SZ 3-6 FEM/TIB PREP

## (undated) DEVICE — DRIP REDUCTION MANIFOLD

## (undated) DEVICE — DISCONTINUED NO SUB IDED TG GLOVE SURG SENSICARE ALOE LT LF PF ST GRN SZ 8

## (undated) DEVICE — BASIC SINGLE BASIN 1-LF: Brand: MEDLINE INDUSTRIES, INC.

## (undated) DEVICE — SET STRYKER GLUE GUN